# Patient Record
Sex: FEMALE | Race: WHITE | Employment: FULL TIME | URBAN - METROPOLITAN AREA
[De-identification: names, ages, dates, MRNs, and addresses within clinical notes are randomized per-mention and may not be internally consistent; named-entity substitution may affect disease eponyms.]

---

## 2018-09-04 ENCOUNTER — HOSPITAL ENCOUNTER (OUTPATIENT)
Age: 53
Discharge: HOME | End: 2018-09-06
Payer: COMMERCIAL

## 2018-09-04 ENCOUNTER — OFFICE VISIT (OUTPATIENT)
Dept: FAMILY MEDICINE CLINIC | Age: 53
End: 2018-09-04
Payer: COMMERCIAL

## 2018-09-04 ENCOUNTER — HOSPITAL ENCOUNTER (OUTPATIENT)
Age: 53
Discharge: HOME | End: 2018-09-04
Payer: COMMERCIAL

## 2018-09-04 ENCOUNTER — TELEPHONE (OUTPATIENT)
Dept: ADMINISTRATIVE | Age: 53
End: 2018-09-04

## 2018-09-04 VITALS
HEART RATE: 68 BPM | WEIGHT: 119 LBS | SYSTOLIC BLOOD PRESSURE: 120 MMHG | BODY MASS INDEX: 21.9 KG/M2 | HEIGHT: 62 IN | RESPIRATION RATE: 16 BRPM | DIASTOLIC BLOOD PRESSURE: 74 MMHG | TEMPERATURE: 98.8 F

## 2018-09-04 DIAGNOSIS — R10.2 PELVIC PAIN: ICD-10-CM

## 2018-09-04 DIAGNOSIS — R10.31 BILATERAL LOWER ABDOMINAL PAIN: Primary | ICD-10-CM

## 2018-09-04 DIAGNOSIS — R10.32 BILATERAL LOWER ABDOMINAL PAIN: Primary | ICD-10-CM

## 2018-09-04 LAB
APPEARANCE FLUID: NORMAL
BACTERIA: ABNORMAL /HPF
BASOPHILS ABSOLUTE: 0.03 E9/L (ref 0–0.2)
BASOPHILS RELATIVE PERCENT: 0.4 % (ref 0–2)
BILIRUBIN URINE: NEGATIVE
BILIRUBIN, POC: NORMAL
BLOOD URINE, POC: NORMAL
BLOOD, URINE: NEGATIVE
CLARITY, POC: NORMAL
CLARITY: CLEAR
COLOR, POC: CLEAR
COLOR: YELLOW
EOSINOPHILS ABSOLUTE: 0.04 E9/L (ref 0.05–0.5)
EOSINOPHILS RELATIVE PERCENT: 0.5 % (ref 0–6)
GLUCOSE URINE, POC: NORMAL
GLUCOSE URINE: NEGATIVE MG/DL
HCT VFR BLD CALC: 39.3 % (ref 34–48)
HEMOGLOBIN: 13 G/DL (ref 11.5–15.5)
IMMATURE GRANULOCYTES #: 0.01 E9/L
IMMATURE GRANULOCYTES %: 0.1 % (ref 0–5)
KETONES, POC: NORMAL
KETONES, URINE: NEGATIVE MG/DL
LEUKOCYTE EST, POC: NORMAL
LEUKOCYTE ESTERASE, URINE: ABNORMAL
LYMPHOCYTES ABSOLUTE: 1.51 E9/L (ref 1.5–4)
LYMPHOCYTES RELATIVE PERCENT: 19.5 % (ref 20–42)
MCH RBC QN AUTO: 31.1 PG (ref 26–35)
MCHC RBC AUTO-ENTMCNC: 33.1 % (ref 32–34.5)
MCV RBC AUTO: 94 FL (ref 80–99.9)
MONOCYTES ABSOLUTE: 0.5 E9/L (ref 0.1–0.95)
MONOCYTES RELATIVE PERCENT: 6.4 % (ref 2–12)
NEUTROPHILS ABSOLUTE: 5.67 E9/L (ref 1.8–7.3)
NEUTROPHILS RELATIVE PERCENT: 73.1 % (ref 43–80)
NITRITE, POC: NORMAL
NITRITE, URINE: NEGATIVE
PDW BLD-RTO: 13.2 FL (ref 11.5–15)
PH UA: 6 (ref 5–9)
PH, POC: 6
PLATELET # BLD: 276 E9/L (ref 130–450)
PMV BLD AUTO: 9.8 FL (ref 7–12)
PROTEIN UA: NEGATIVE MG/DL
PROTEIN, POC: NORMAL
RBC # BLD: 4.18 E12/L (ref 3.5–5.5)
RBC UA: ABNORMAL /HPF (ref 0–2)
RENAL EPITHELIAL, UA: ABNORMAL /HPF
SEDIMENTATION RATE, ERYTHROCYTE: 52 MM/HR (ref 0–20)
SPECIFIC GRAVITY UA: 1.01 (ref 1–1.03)
SPECIFIC GRAVITY, POC: 1
UROBILINOGEN, POC: 0.2
UROBILINOGEN, URINE: 0.2 E.U./DL
WBC # BLD: 7.8 E9/L (ref 4.5–11.5)
WBC UA: ABNORMAL /HPF (ref 0–5)

## 2018-09-04 PROCEDURE — 81001 URINALYSIS AUTO W/SCOPE: CPT

## 2018-09-04 PROCEDURE — 85025 COMPLETE CBC W/AUTO DIFF WBC: CPT

## 2018-09-04 PROCEDURE — 81002 URINALYSIS NONAUTO W/O SCOPE: CPT | Performed by: FAMILY MEDICINE

## 2018-09-04 PROCEDURE — 85651 RBC SED RATE NONAUTOMATED: CPT

## 2018-09-04 PROCEDURE — 87088 URINE BACTERIA CULTURE: CPT

## 2018-09-04 PROCEDURE — 99213 OFFICE O/P EST LOW 20 MIN: CPT | Performed by: FAMILY MEDICINE

## 2018-09-04 PROCEDURE — 36415 COLL VENOUS BLD VENIPUNCTURE: CPT

## 2018-09-04 ASSESSMENT — PATIENT HEALTH QUESTIONNAIRE - PHQ9
1. LITTLE INTEREST OR PLEASURE IN DOING THINGS: 0
2. FEELING DOWN, DEPRESSED OR HOPELESS: 0
SUM OF ALL RESPONSES TO PHQ9 QUESTIONS 1 & 2: 0
SUM OF ALL RESPONSES TO PHQ QUESTIONS 1-9: 0
SUM OF ALL RESPONSES TO PHQ QUESTIONS 1-9: 0

## 2018-09-05 DIAGNOSIS — K52.9 ACUTE COLITIS: Primary | ICD-10-CM

## 2018-09-05 LAB — URINE CULTURE, ROUTINE: NORMAL

## 2018-09-05 RX ORDER — AMOXICILLIN AND CLAVULANATE POTASSIUM 875; 125 MG/1; MG/1
1 TABLET, FILM COATED ORAL 2 TIMES DAILY
Qty: 14 TABLET | Refills: 0 | Status: SHIPPED | OUTPATIENT
Start: 2018-09-05 | End: 2018-09-15

## 2018-09-05 NOTE — PROGRESS NOTES
Call patient   The blood test shows a possible inflammation of the colon  I will send a script for an antibiotic  See me next week  Thanks.

## 2018-09-11 ENCOUNTER — OFFICE VISIT (OUTPATIENT)
Dept: FAMILY MEDICINE CLINIC | Age: 53
End: 2018-09-11
Payer: COMMERCIAL

## 2018-09-11 VITALS
WEIGHT: 119 LBS | SYSTOLIC BLOOD PRESSURE: 121 MMHG | RESPIRATION RATE: 16 BRPM | BODY MASS INDEX: 21.77 KG/M2 | DIASTOLIC BLOOD PRESSURE: 76 MMHG | HEART RATE: 63 BPM

## 2018-09-11 DIAGNOSIS — K57.92 ACUTE DIVERTICULITIS: Primary | ICD-10-CM

## 2018-09-11 PROCEDURE — 99213 OFFICE O/P EST LOW 20 MIN: CPT | Performed by: FAMILY MEDICINE

## 2018-09-11 NOTE — PROGRESS NOTES
Abdominal pain is much better  No fever or chills  Normal bowel movements  On augmentin bid  No fever or chills  Labs reviewed and normal CBC but elevated ESR    Physical examination :  /76   Pulse 63   Resp 16   Wt 119 lb (54 kg)   BMI 21.77 kg/m²     General appearance : healthy, no distress. Eyes : non-icteric sclerae. No goiter. Neck is supple, no masses. No carotid bruits  Lungs : clear bilaterally, no wheezing or crackles. Heart : regular, normal S1S2, no murmurs. Abdomen : soft, no masses. No hepatic or splenomegaly. Extremities : no edema. Peripheral pulses : normal.  Gait : normal.  Mood :euthymic. Affect : congruent. BMI was below limits today and the following plan was recommended: general diet. A/P  Likely acute diverticulitis resolving  Continue and finish augmentin  Precautions given.   Advised pap and mammogram

## 2019-04-29 ENCOUNTER — OFFICE VISIT (OUTPATIENT)
Dept: FAMILY MEDICINE CLINIC | Age: 54
End: 2019-04-29
Payer: COMMERCIAL

## 2019-04-29 ENCOUNTER — HOSPITAL ENCOUNTER (OUTPATIENT)
Age: 54
Discharge: HOME | End: 2019-05-01
Payer: COMMERCIAL

## 2019-04-29 ENCOUNTER — HOSPITAL ENCOUNTER (OUTPATIENT)
Dept: GENERAL RADIOLOGY | Age: 54
Discharge: HOME | End: 2019-05-01
Payer: COMMERCIAL

## 2019-04-29 VITALS
DIASTOLIC BLOOD PRESSURE: 85 MMHG | HEART RATE: 69 BPM | BODY MASS INDEX: 23.04 KG/M2 | RESPIRATION RATE: 18 BRPM | HEIGHT: 63 IN | WEIGHT: 130 LBS | SYSTOLIC BLOOD PRESSURE: 129 MMHG

## 2019-04-29 DIAGNOSIS — M79.672 LEFT FOOT PAIN: Primary | ICD-10-CM

## 2019-04-29 DIAGNOSIS — J30.1 SEASONAL ALLERGIC RHINITIS DUE TO POLLEN: ICD-10-CM

## 2019-04-29 DIAGNOSIS — M79.672 LEFT FOOT PAIN: ICD-10-CM

## 2019-04-29 PROCEDURE — 4004F PT TOBACCO SCREEN RCVD TLK: CPT | Performed by: FAMILY MEDICINE

## 2019-04-29 PROCEDURE — 99213 OFFICE O/P EST LOW 20 MIN: CPT | Performed by: FAMILY MEDICINE

## 2019-04-29 PROCEDURE — 3017F COLORECTAL CA SCREEN DOC REV: CPT | Performed by: FAMILY MEDICINE

## 2019-04-29 PROCEDURE — 73630 X-RAY EXAM OF FOOT: CPT

## 2019-04-29 PROCEDURE — G8427 DOCREV CUR MEDS BY ELIG CLIN: HCPCS | Performed by: FAMILY MEDICINE

## 2019-04-29 PROCEDURE — G8420 CALC BMI NORM PARAMETERS: HCPCS | Performed by: FAMILY MEDICINE

## 2019-04-29 RX ORDER — MOMETASONE FUROATE 50 UG/1
2 SPRAY, METERED NASAL DAILY
Qty: 1 INHALER | Refills: 5 | Status: SHIPPED
Start: 2019-04-29 | End: 2020-12-04 | Stop reason: SDUPTHER

## 2019-04-29 RX ORDER — GABAPENTIN 600 MG/1
TABLET ORAL
Refills: 2 | COMMUNITY
Start: 2019-04-22 | End: 2019-05-23

## 2019-04-29 ASSESSMENT — PATIENT HEALTH QUESTIONNAIRE - PHQ9
SUM OF ALL RESPONSES TO PHQ9 QUESTIONS 1 & 2: 0
SUM OF ALL RESPONSES TO PHQ QUESTIONS 1-9: 0
2. FEELING DOWN, DEPRESSED OR HOPELESS: 0
SUM OF ALL RESPONSES TO PHQ QUESTIONS 1-9: 0
1. LITTLE INTEREST OR PLEASURE IN DOING THINGS: 0

## 2019-04-29 NOTE — PROGRESS NOTES
One month ago tripped and fell  Continues to have pain in left foot superiorly. Pain that wraps around to the plantar aspect of the forefoot. Very little swelling  No bruising  Pain is about the same for the past month or so. He is able to walk although this tends to exacerbate the pain. Examination  Blood pressure 129/85, pulse 69, resp. rate 18, height 5' 2.5\" (1.588 m), weight 130 lb (59 kg), not currently breastfeeding. Normal gait  Ankle examination reveals no edema or deformities. Range of motion is normal  No edema or discoloration of the left foot. Minimal tenderness over lateral inframalleolar area  Normal ROM  Able to walk    A/P  Foot pain  Likely sprain  X rays  Rest  Use a walking shoe. Precautions given. If pain does not improve with rest for the next 2-3 weeks she will need to be referred to a podiatrist. Patient fully informed.

## 2019-10-29 ENCOUNTER — OFFICE VISIT (OUTPATIENT)
Dept: FAMILY MEDICINE CLINIC | Age: 54
End: 2019-10-29
Payer: COMMERCIAL

## 2019-10-29 ENCOUNTER — TELEPHONE (OUTPATIENT)
Dept: FAMILY MEDICINE CLINIC | Age: 54
End: 2019-10-29

## 2019-10-29 VITALS
RESPIRATION RATE: 16 BRPM | DIASTOLIC BLOOD PRESSURE: 80 MMHG | BODY MASS INDEX: 24.27 KG/M2 | HEART RATE: 58 BPM | HEIGHT: 63 IN | SYSTOLIC BLOOD PRESSURE: 124 MMHG | WEIGHT: 137 LBS

## 2019-10-29 DIAGNOSIS — M85.89 OSTEOPENIA OF MULTIPLE SITES: Chronic | ICD-10-CM

## 2019-10-29 DIAGNOSIS — M79.671 RIGHT FOOT PAIN: ICD-10-CM

## 2019-10-29 DIAGNOSIS — M79.7 FIBROMYALGIA: Primary | ICD-10-CM

## 2019-10-29 DIAGNOSIS — J30.1 SEASONAL ALLERGIC RHINITIS DUE TO POLLEN: ICD-10-CM

## 2019-10-29 PROCEDURE — G8427 DOCREV CUR MEDS BY ELIG CLIN: HCPCS | Performed by: FAMILY MEDICINE

## 2019-10-29 PROCEDURE — G8484 FLU IMMUNIZE NO ADMIN: HCPCS | Performed by: FAMILY MEDICINE

## 2019-10-29 PROCEDURE — 1036F TOBACCO NON-USER: CPT | Performed by: FAMILY MEDICINE

## 2019-10-29 PROCEDURE — 99213 OFFICE O/P EST LOW 20 MIN: CPT | Performed by: FAMILY MEDICINE

## 2019-10-29 PROCEDURE — G8420 CALC BMI NORM PARAMETERS: HCPCS | Performed by: FAMILY MEDICINE

## 2019-10-29 PROCEDURE — 3017F COLORECTAL CA SCREEN DOC REV: CPT | Performed by: FAMILY MEDICINE

## 2019-10-29 RX ORDER — GABAPENTIN 600 MG/1
TABLET ORAL
Qty: 270 TABLET | Refills: 0 | Status: SHIPPED | OUTPATIENT
Start: 2019-10-29 | End: 2019-11-29 | Stop reason: SDUPTHER

## 2019-11-29 DIAGNOSIS — M79.7 FIBROMYALGIA: ICD-10-CM

## 2019-11-30 RX ORDER — GABAPENTIN 600 MG/1
TABLET ORAL
Qty: 270 TABLET | Refills: 0 | Status: SHIPPED
Start: 2019-11-30 | End: 2020-04-10 | Stop reason: SDUPTHER

## 2020-04-10 ENCOUNTER — TELEPHONE (OUTPATIENT)
Dept: FAMILY MEDICINE CLINIC | Age: 55
End: 2020-04-10

## 2020-04-10 RX ORDER — GABAPENTIN 600 MG/1
TABLET ORAL
Qty: 270 TABLET | Refills: 0 | Status: SHIPPED
Start: 2020-04-10 | End: 2020-09-04

## 2020-04-10 NOTE — TELEPHONE ENCOUNTER
Call patient  Refill sent  She needs to make apt with Dr. Griffin Nguyen in the next 1-2 months to get established and for future refills  Thanks.

## 2020-09-04 RX ORDER — GABAPENTIN 600 MG/1
600 TABLET ORAL 3 TIMES DAILY
Qty: 90 TABLET | Refills: 0 | Status: SHIPPED
Start: 2020-09-04 | End: 2020-09-29

## 2020-09-29 RX ORDER — GABAPENTIN 600 MG/1
TABLET ORAL
Qty: 90 TABLET | Refills: 0 | Status: SHIPPED
Start: 2020-09-29 | End: 2020-11-06

## 2020-09-29 NOTE — TELEPHONE ENCOUNTER
[facility] shows refills as follows:  10/19/18 #90 for 30d  11/20/18 #90 for 30d  2/18/19 #90 for 30d  3/22/19 #90 for 30d  10/29/19 #90 for 10d    Our chart on the other hand is showing monthly refills for 600mg TID #90. Please clarify with the patient that she has been on this daily at the 600mg TID dosing. I prefer not to make dose changes to this at this time without seeing her to discuss. One possible reason is filling out of state (saw the 10/1/20 appt moved due to \"being out of state\") or that the pharmacy didn't report it to the [facility] system.     Thanks

## 2020-09-29 NOTE — TELEPHONE ENCOUNTER
Last Appointment   10/29/2019  Next Appointment  12/3/2020 to establish with [place]. Patient called, states that she is due for her refill on gabapentin.   Med pending drs approval.

## 2020-11-06 RX ORDER — GABAPENTIN 600 MG/1
TABLET ORAL
Qty: 90 TABLET | Refills: 0 | Status: SHIPPED
Start: 2020-11-06 | End: 2020-12-03 | Stop reason: SDUPTHER

## 2020-12-03 ENCOUNTER — VIRTUAL VISIT (OUTPATIENT)
Dept: FAMILY MEDICINE CLINIC | Age: 55
End: 2020-12-03
Payer: COMMERCIAL

## 2020-12-03 PROCEDURE — 1036F TOBACCO NON-USER: CPT | Performed by: FAMILY MEDICINE

## 2020-12-03 PROCEDURE — 3017F COLORECTAL CA SCREEN DOC REV: CPT | Performed by: FAMILY MEDICINE

## 2020-12-03 PROCEDURE — G8421 BMI NOT CALCULATED: HCPCS | Performed by: FAMILY MEDICINE

## 2020-12-03 PROCEDURE — G8427 DOCREV CUR MEDS BY ELIG CLIN: HCPCS | Performed by: FAMILY MEDICINE

## 2020-12-03 PROCEDURE — G8484 FLU IMMUNIZE NO ADMIN: HCPCS | Performed by: FAMILY MEDICINE

## 2020-12-03 PROCEDURE — 99213 OFFICE O/P EST LOW 20 MIN: CPT | Performed by: FAMILY MEDICINE

## 2020-12-03 ASSESSMENT — PATIENT HEALTH QUESTIONNAIRE - PHQ9
SUM OF ALL RESPONSES TO PHQ QUESTIONS 1-9: 0
1. LITTLE INTEREST OR PLEASURE IN DOING THINGS: 0
SUM OF ALL RESPONSES TO PHQ QUESTIONS 1-9: 0
SUM OF ALL RESPONSES TO PHQ9 QUESTIONS 1 & 2: 0
SUM OF ALL RESPONSES TO PHQ QUESTIONS 1-9: 0
2. FEELING DOWN, DEPRESSED OR HOPELESS: 0

## 2020-12-03 NOTE — PROGRESS NOTES
12/3/2020    TELEHEALTH EVALUATION -- Audio (During KIDOJ-[id number] public health emergency)    HPI:    Pardeep Deleon (:  1965) has requested an audio/video evaluation for the following concern(s):    Low Bone Density and early menopause - was told she has low bone density in  - no DEXA available today. Did have fx ankle (5th metatarsal) and probable fx wrist after a fall this year though she states no XR was done. Neuropathy - uses gabapentin for fibromyalgia and vulvodynia pains. NO SE to meds. Tolerating well and the meds seem to help her. Wishes to continue. Will need renal function check. Does feel worse if she is late for a dose. Allergic rhinitis - some dust with albert decorations but overall sx are controlled. Review of Systems   Respiratory: Negative for chest tightness and shortness of breath. Cardiovascular: Negative for chest pain and palpitations. Gastrointestinal: Negative for abdominal pain. Prior to Visit Medications    Medication Sig Taking? Authorizing Provider   mometasone (NASONEX) 50 MCG/ACT nasal spray 2 sprays by Nasal route daily Yes Maribeth Herrera MD   gabapentin (NEURONTIN) 600 MG tablet Take 1 tablet by mouth 3 times daily for 30 days. Yes Maribeth Herrera MD   Omega-3 Fatty Acids (FISH OIL) 1000 MG CAPS OTC Yes Maribeth Herrera MD   Turmeric (QC TUMERIC COMPLEX) 500 MG CAPS OTC Yes Maribeth Herrera MD   Multiple Vitamins-Minerals (MULTIVITAMIN WITH MINERALS) tablet otc Yes Maribeth Herrera MD   vitamin D 1000 UNITS CAPS Take 1 capsule by mouth daily Yes Historical Provider, MD       Social History     Tobacco Use    Smoking status: Former Smoker     Packs/day: 0.50     Years: 0.50     Pack years: 0.25     Types: E-Cigarettes     Last attempt to quit: 2018     Years since quittin.6    Smokeless tobacco: Former User   Substance Use Topics    Alcohol use:  Yes    Drug use: No          PHYSICAL EXAMINATION: Psychiatric:       [x] Normal Affect [] No Hallucinations        [] Abnormal-     Other pertinent observable physical exam findings-     ASSESSMENT/PLAN:  1. Fibromyalgia  Stable sx - cont meds  - mometasone (NASONEX) 50 MCG/ACT nasal spray; 2 sprays by Nasal route daily  Dispense: 1 Inhaler; Refill: 5  - gabapentin (NEURONTIN) 600 MG tablet; Take 1 tablet by mouth 3 times daily for 30 days. Dispense: 270 tablet; Refill: 1  - Omega-3 Fatty Acids (FISH OIL) 1000 MG CAPS; OTC  Dispense: 1 capsule; Refill: 0  - Turmeric (QC TUMERIC COMPLEX) 500 MG CAPS; OTC  Dispense: 1 capsule; Refill: 0  - Multiple Vitamins-Minerals (MULTIVITAMIN WITH MINERALS) tablet; otc  Dispense: 30 tablet; Refill: 3  - COMPREHENSIVE METABOLIC PANEL; Future  - CBC; Future  - TSH; Future  - Lipid, Fasting; Future  - Vitamin D 25 Hydroxy; Future  - JOCELIN DIGITAL SCREEN BILATERAL PER PROTOCOL; Future    2. Encounter for screening mammogram for breast cancer  Screen recommended  - JOCELIN DIGITAL SCREEN BILATERAL PER PROTOCOL; Future    3. Osteopenia, unspecified location  Will need to consider repeat DEXA - discussed maintaining dietary Ca ~1000-1200mg daily. Discussed risks/benefits of Ca supplementation.    - mometasone (NASONEX) 50 MCG/ACT nasal spray; 2 sprays by Nasal route daily  Dispense: 1 Inhaler; Refill: 5  - gabapentin (NEURONTIN) 600 MG tablet; Take 1 tablet by mouth 3 times daily for 30 days. Dispense: 270 tablet; Refill: 1  - Omega-3 Fatty Acids (FISH OIL) 1000 MG CAPS; OTC  Dispense: 1 capsule; Refill: 0  - Turmeric (QC TUMERIC COMPLEX) 500 MG CAPS; OTC  Dispense: 1 capsule; Refill: 0  - Multiple Vitamins-Minerals (MULTIVITAMIN WITH MINERALS) tablet; otc  Dispense: 30 tablet; Refill: 3  - COMPREHENSIVE METABOLIC PANEL; Future  - CBC; Future  - TSH; Future  - Lipid, Fasting; Future  - Vitamin D 25 Hydroxy; Future  - JOCELIN DIGITAL SCREEN BILATERAL PER PROTOCOL;  Future      Return in about 1 year (around 12/3/2021) for

## 2020-12-04 RX ORDER — GABAPENTIN 600 MG/1
600 TABLET ORAL 3 TIMES DAILY
Qty: 270 TABLET | Refills: 1 | Status: SHIPPED
Start: 2020-12-04 | End: 2021-05-10

## 2020-12-04 RX ORDER — MOMETASONE FUROATE 50 UG/1
2 SPRAY, METERED NASAL DAILY
Qty: 1 INHALER | Refills: 5 | Status: SHIPPED
Start: 2020-12-04 | End: 2022-02-21 | Stop reason: SDUPTHER

## 2020-12-04 RX ORDER — GABAPENTIN 600 MG/1
TABLET ORAL
Qty: 270 TABLET | Refills: 0 | OUTPATIENT
Start: 2020-12-04 | End: 2021-01-03

## 2020-12-04 RX ORDER — CHLORAL HYDRATE 500 MG
CAPSULE ORAL
Qty: 1 CAPSULE | Refills: 0
Start: 2020-12-04

## 2020-12-04 RX ORDER — VIT C/B6/B5/MAGNESIUM/HERB 173 50-5-6-5MG
CAPSULE ORAL
Qty: 1 CAPSULE | Refills: 0
Start: 2020-12-04

## 2020-12-04 RX ORDER — MULTIVIT-MIN/IRON FUM/FOLIC AC 7.5 MG-4
TABLET ORAL
Qty: 30 TABLET | Refills: 3
Start: 2020-12-04

## 2020-12-08 ASSESSMENT — ENCOUNTER SYMPTOMS
SHORTNESS OF BREATH: 0
ABDOMINAL PAIN: 0
CHEST TIGHTNESS: 0

## 2021-05-08 DIAGNOSIS — M79.7 FIBROMYALGIA: ICD-10-CM

## 2021-05-08 DIAGNOSIS — M85.80 OSTEOPENIA, UNSPECIFIED LOCATION: Chronic | ICD-10-CM

## 2021-05-10 RX ORDER — GABAPENTIN 600 MG/1
600 TABLET ORAL 3 TIMES DAILY
Qty: 270 TABLET | Refills: 0 | Status: SHIPPED
Start: 2021-05-10 | End: 2021-09-09

## 2021-09-09 DIAGNOSIS — M79.7 FIBROMYALGIA: ICD-10-CM

## 2021-09-09 DIAGNOSIS — M85.80 OSTEOPENIA, UNSPECIFIED LOCATION: Chronic | ICD-10-CM

## 2021-09-09 RX ORDER — GABAPENTIN 600 MG/1
600 TABLET ORAL 3 TIMES DAILY
Qty: 270 TABLET | Refills: 0 | Status: SHIPPED
Start: 2021-09-09 | End: 2021-12-13

## 2021-09-09 NOTE — TELEPHONE ENCOUNTER
Called and spoke to the pharmacist. She tried to run it as well and confirmed all spelling /   and they were not able to run report either. She stated that the only thing she can think of is that they have not made Gabapentin controlled yet and it may not be getting reported in the state of [place].

## 2021-09-10 ENCOUNTER — TELEPHONE (OUTPATIENT)
Dept: FAMILY MEDICINE CLINIC | Age: 56
End: 2021-09-10

## 2021-09-10 NOTE — TELEPHONE ENCOUNTER
----- Message from Stephanie Plants sent at 9/9/2021  4:58 PM EDT -----  Subject: Message to Provider    QUESTIONS  Information for Provider? Deidre at [address] [address] just spoke   with Dr. Shayy Gutierrez and she misspoke about [facility]. System does connect with   O[facility], gabapentin is not showing up, but other controls do. Just an FYI  ---------------------------------------------------------------------------  --------------  CALL BACK INFO  What is the best way for the office to contact you? OK to leave message on   voicemail  Preferred Call Back Phone Number? [phone]  ---------------------------------------------------------------------------  --------------  SCRIPT ANSWERS  Relationship to Patient? Third Party  Representative Name?  Deidre at [address] [address]

## 2021-12-13 DIAGNOSIS — M79.7 FIBROMYALGIA: ICD-10-CM

## 2021-12-13 DIAGNOSIS — M85.80 OSTEOPENIA, UNSPECIFIED LOCATION: Chronic | ICD-10-CM

## 2021-12-13 RX ORDER — GABAPENTIN 600 MG/1
600 TABLET ORAL 3 TIMES DAILY
Qty: 270 TABLET | Refills: 0 | Status: SHIPPED
Start: 2021-12-13 | End: 2022-02-21 | Stop reason: SDUPTHER

## 2021-12-13 NOTE — TELEPHONE ENCOUNTER
Last Appointment   12/3/2020  Next Appointment      Pt is complete out of her medication, She made an appointment to come in next month.   She is very busy with going to [facility] with her 's brain cancer so she made the appointment with the intension of coming in

## 2022-02-05 ENCOUNTER — APPOINTMENT (OUTPATIENT)
Dept: GENERAL RADIOLOGY | Age: 57
End: 2022-02-05
Payer: COMMERCIAL

## 2022-02-05 ENCOUNTER — HOSPITAL ENCOUNTER (EMERGENCY)
Age: 57
Discharge: HOME | End: 2022-02-05
Payer: COMMERCIAL

## 2022-02-05 VITALS
HEART RATE: 55 BPM | HEIGHT: 62 IN | SYSTOLIC BLOOD PRESSURE: 132 MMHG | TEMPERATURE: 95.5 F | WEIGHT: 135 LBS | BODY MASS INDEX: 24.84 KG/M2 | RESPIRATION RATE: 16 BRPM | DIASTOLIC BLOOD PRESSURE: 62 MMHG | OXYGEN SATURATION: 98 %

## 2022-02-05 DIAGNOSIS — S82.64XA CLOSED NONDISPLACED FRACTURE OF LATERAL MALLEOLUS OF RIGHT FIBULA, INITIAL ENCOUNTER: Primary | ICD-10-CM
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
## 2022-02-05 PROCEDURE — 99283 EMERGENCY DEPT VISIT LOW MDM: CPT

## 2022-02-05 PROCEDURE — 73630 X-RAY EXAM OF FOOT: CPT

## 2022-02-05 PROCEDURE — 29515 APPLICATION SHORT LEG SPLINT: CPT

## 2022-02-05 PROCEDURE — 73610 X-RAY EXAM OF ANKLE: CPT

## 2022-02-05 ASSESSMENT — PAIN DESCRIPTION - PROGRESSION: CLINICAL_PROGRESSION: NOT CHANGED

## 2022-02-05 ASSESSMENT — PAIN SCALES - GENERAL: PAINLEVEL_OUTOF10: 5

## 2022-02-05 ASSESSMENT — PAIN DESCRIPTION - FREQUENCY: FREQUENCY: CONTINUOUS

## 2022-02-05 ASSESSMENT — PAIN DESCRIPTION - ORIENTATION: ORIENTATION: RIGHT

## 2022-02-05 ASSESSMENT — PAIN DESCRIPTION - PAIN TYPE: TYPE: ACUTE PAIN

## 2022-02-05 ASSESSMENT — PAIN DESCRIPTION - LOCATION: LOCATION: ANKLE

## 2022-02-05 NOTE — ED PROVIDER NOTES
[facility]  Department of Emergency Medicine   ED  Encounter Note  Admit Date/RoomTime: 2022 12:33 PM  ED Room: 10/10    NAME: Gee Chapman  : 1965  MRN: [id number]     Chief Complaint:  Ankle Pain (fell down steps and injured right ankle, also co buttocks pain and lower back pain which is chronic)    History of Present Illness         Gee Chapman is a 61 y.o. old female presenting to the emergency department by private vehicle, for traumatic Right foot and ankle pain which occured 1 day(s) prior to arrival.  The complaint is due to a fall from tripping and down several steps while at home. Since onset the symptoms have been waxing and waning with inability to bear weight,swelling,bruising. Patient has no prior history of pain/injury with regards to today's visit. Her pain is aggraveated by standing or walking and relieved by nothing, as no treatment has been provided prior to this visit. She denies any head injury, headache, loss of consciousness, confusion, dizziness, neck pain, chest pain or abdominal pain. Tetanus Status: up to date. ROS   Pertinent positives and negatives are stated within HPI, all other systems reviewed and are negative. Past Medical History:  has a past medical history of Allergic rhinitis, Early menopause, Fibromyalgia, H/O mammogram, Osteopenia, Pap test, as part of routine gynecological examination, Tobacco abuse, and Vulvodynia. Surgical History:  has a past surgical history that includes Colonoscopy (2013 Bernice Ovalles) and laparoscopy (1996). Social History:  reports that she quit smoking about 3 years ago. Her smoking use included e-cigarettes. She has a 0.25 pack-year smoking history. She has quit using smokeless tobacco. She reports current alcohol use. She reports that she does not use drugs. Family History: family history includes Other in her father and mother. Allergies:  Other    Physical Exam   Oxygen Saturation Interpretation: Normal.        ED Triage Vitals   BP Temp Temp src Pulse Resp SpO2 Height Weight   02/05/22 1219 02/05/22 1219 -- 02/05/22 1219 02/05/22 1219 02/05/22 1219 02/05/22 1230 02/05/22 1230   132/62 95.5 °F (35.3 °C)  55 16 98 % 5' 2\" (1.575 m) 135 lb (61.2 kg)         Constitutional:  Alert, development consistent with age. Neck:  Normal ROM. Supple. Right Ankle: Lateral malleolus              Tenderness:  severe. Swelling: Moderate. Deformity: no deformity observed/palpated. ROM: diminished range with pain. Skin:  tenderness, swelling and ecchymosis. Neurovascular: Motor deficit: none. Sensory deficit:   none. Pulse deficit: none. Capillary refill: normal.  Right Knee:              Tenderness:  none. Swelling: None. Deformity: no deformity observed/palpated. ROM: full range of motion. Skin:  no wounds, erythema, or swelling. Right Foot: diffusely across entire foot              Tenderness:  none. Swelling: None. Deformity: no deformity observed/palpated. ROM: full range of motion. Skin:  no wounds, erythema, or swelling  Gait:  limp due to affected limb. Lymphatics: No lymphangitis or adenopathy noted. Neurological:  Oriented. Motor functions intact. Lab / Imaging Results   (All laboratory and radiology results have been personally reviewed by myself)  Labs:  No results found for this visit on 02/05/22. Imaging: All Radiology results interpreted by Radiologist unless otherwise noted. XR FOOT RIGHT (MIN 3 VIEWS)   Final Result   Acute fracture of the lateral malleolus. No acute fractures or dislocation in the right foot.   The         XR ANKLE RIGHT (MIN 3 VIEWS)   Final Result   Presence of acute displaced fracture of the distal right fibula through the   level of the home.  Patient condition is good    New Medications     Discharge Medication List as of 2/5/2022  2:17 PM        Electronically signed by MARNI Ferraro CNP   DD: 2/5/22  **This report was transcribed using voice recognition software. Every effort was made to ensure accuracy; however, inadvertent computerized transcription errors may be present.   END OF ED PROVIDER NOTE       MARNI Srinivasan CNP  02/05/22 [id number]

## 2022-02-05 NOTE — Clinical Note
Sola Cruz was seen and treated in our emergency department on 2/5/2022. She may return to work on 02/09/2022. If you have any questions or concerns, please don't hesitate to call.       Aleksey Chadwick, MARNI - CNP

## 2022-02-21 ENCOUNTER — OFFICE VISIT (OUTPATIENT)
Dept: FAMILY MEDICINE CLINIC | Age: 57
End: 2022-02-21
Payer: COMMERCIAL

## 2022-02-21 VITALS
TEMPERATURE: 98.2 F | HEART RATE: 55 BPM | OXYGEN SATURATION: 99 % | DIASTOLIC BLOOD PRESSURE: 77 MMHG | SYSTOLIC BLOOD PRESSURE: 124 MMHG

## 2022-02-21 DIAGNOSIS — M79.7 FIBROMYALGIA: ICD-10-CM

## 2022-02-21 DIAGNOSIS — Z12.31 ENCOUNTER FOR SCREENING MAMMOGRAM FOR MALIGNANT NEOPLASM OF BREAST: ICD-10-CM

## 2022-02-21 DIAGNOSIS — J30.2 SEASONAL ALLERGIC RHINITIS, UNSPECIFIED TRIGGER: Primary | ICD-10-CM

## 2022-02-21 DIAGNOSIS — M85.80 OSTEOPENIA, UNSPECIFIED LOCATION: Chronic | ICD-10-CM

## 2022-02-21 PROCEDURE — G8420 CALC BMI NORM PARAMETERS: HCPCS

## 2022-02-21 PROCEDURE — G8427 DOCREV CUR MEDS BY ELIG CLIN: HCPCS

## 2022-02-21 PROCEDURE — 1036F TOBACCO NON-USER: CPT

## 2022-02-21 PROCEDURE — 3017F COLORECTAL CA SCREEN DOC REV: CPT

## 2022-02-21 PROCEDURE — 99214 OFFICE O/P EST MOD 30 MIN: CPT

## 2022-02-21 PROCEDURE — G8484 FLU IMMUNIZE NO ADMIN: HCPCS

## 2022-02-21 RX ORDER — GABAPENTIN 600 MG/1
600 TABLET ORAL 3 TIMES DAILY
Qty: 270 TABLET | Refills: 0 | Status: SHIPPED | OUTPATIENT
Start: 2022-02-21 | End: 2022-06-16

## 2022-02-21 RX ORDER — MOMETASONE FUROATE 50 UG/1
2 SPRAY, METERED NASAL DAILY
Qty: 1 EACH | Refills: 0 | Status: SHIPPED | OUTPATIENT
Start: 2022-02-21

## 2022-02-21 SDOH — ECONOMIC STABILITY: FOOD INSECURITY: WITHIN THE PAST 12 MONTHS, YOU WORRIED THAT YOUR FOOD WOULD RUN OUT BEFORE YOU GOT MONEY TO BUY MORE.: NEVER TRUE

## 2022-02-21 SDOH — ECONOMIC STABILITY: FOOD INSECURITY: WITHIN THE PAST 12 MONTHS, THE FOOD YOU BOUGHT JUST DIDN'T LAST AND YOU DIDN'T HAVE MONEY TO GET MORE.: NEVER TRUE

## 2022-02-21 ASSESSMENT — SOCIAL DETERMINANTS OF HEALTH (SDOH): HOW HARD IS IT FOR YOU TO PAY FOR THE VERY BASICS LIKE FOOD, HOUSING, MEDICAL CARE, AND HEATING?: NOT HARD AT ALL

## 2022-02-21 ASSESSMENT — ENCOUNTER SYMPTOMS
SHORTNESS OF BREATH: 0
RHINORRHEA: 0
EYE PAIN: 0
WHEEZING: 0
CONSTIPATION: 0
VOMITING: 0
ABDOMINAL PAIN: 0
COUGH: 0
DIARRHEA: 0
NAUSEA: 0
CHEST TIGHTNESS: 0
SORE THROAT: 0

## 2022-02-21 ASSESSMENT — PATIENT HEALTH QUESTIONNAIRE - PHQ9
2. FEELING DOWN, DEPRESSED OR HOPELESS: 0
SUM OF ALL RESPONSES TO PHQ QUESTIONS 1-9: 0
SUM OF ALL RESPONSES TO PHQ9 QUESTIONS 1 & 2: 0
SUM OF ALL RESPONSES TO PHQ QUESTIONS 1-9: 0
1. LITTLE INTEREST OR PLEASURE IN DOING THINGS: 0

## 2022-02-21 NOTE — PROGRESS NOTES
S: 61 y.o. female with   Chief Complaint   Patient presents with    Medication Refill       Rhinitis  -f/u  -tolerating medication well    Fibromyalgia  -f/u  -tolerating medication well    Mood  - passed away, is coping well, follows with counselor    Right foot fx  -following with specialist for this    O: VS:  temperature is 98.2 °F (36.8 °C). Her blood pressure is 124/77 and her pulse is 55. Her oxygen saturation is 99%. BP Readings from Last 3 Encounters:   02/21/22 124/77   02/05/22 132/62   10/29/19 124/80     See resident note    Impression/Plan:   1) Rhinitis - refill nasonex  2) Fibro - refill neurontin  3) Mood - doing ok, coping normally   4) Right ankle fx - follow with specialist   5) Annual physical - labs   6) Cancer screening - mammo and pap       Health Maintenance Due   Topic Date Due    Hepatitis C screen  Never done    COVID-19 Vaccine (1) Never done    Depression Screen  Never done    HIV screen  Never done    DTaP/Tdap/Td vaccine (1 - Tdap) Never done    Shingles Vaccine (1 of 2) Never done    Lipid screen  07/31/2017    Breast cancer screen  08/04/2017    Cervical cancer screen  08/04/2018    Flu vaccine (1) Never done         Attending Physician Statement  I have discussed the case, including pertinent history and exam findings with the resident. I agree with the documented assessment and plan.       Nita Simmonds,

## 2022-02-21 NOTE — PROGRESS NOTES
[facility]  Department of Family Medicine  Family Medicine Residency Program      Patient: Andrea Gusman 61 y.o. female     Date of Service: 2/21/22      Chief complaint:   Chief Complaint   Patient presents with    Medication Refill       HISTORY OF PRESENTING ILLNESS     61 y.o. female with a PMH of fibromyalgia and osteopenia presented to the clinic for a medication refill-gabapentin and Nasonex    Fibromyalgia  -Patient has a history of fibromyalgia and has been on gabapentin since 1994  -Patient tolerating medication well, denies side effects  -Currently denies myalgia, arthralgia, fatigue     Allergic rhinitis  -Patient would like refill of Nasonex  -States she has symptoms of rhinorrhea, sneezing during spring and fall  -States she tolerates medication well and uses as prescribed  -Patient has not not had to use medication since last fall but would like to have prescription as weather is changing    Fibular fracture (02/05/22)  -Closed nondisplaced fracture of lateral malleolus of right fibula  -pt sees Dr. Paullette Osgood  -Patient has been using crutches to ambulate     passed away 1 month ago   -good support at home  -Follows with counselor  -Patient reports waves of depressed mood.   Denies suicidal ideation, suicidal ideation, self injury, sleep disturbances, difficulty concentrating, anhedonia   -States \" I am  did not want to be mourned but celebrated\"    Care gaps  Last Pap 5 years ago  Patient refuses vaccination at this time    Health Maintenance:  Health Maintenance Due   Topic Date Due    Hepatitis C screen  Never done    COVID-19 Vaccine (1) Never done    Depression Screen  Never done    HIV screen  Never done    DTaP/Tdap/Td vaccine (1 - Tdap) Never done    Shingles Vaccine (1 of 2) Never done    Lipid screen  07/31/2017    Breast cancer screen  08/04/2017    Cervical cancer screen  08/04/2018    Flu vaccine (1) Never done     Past Medical History: Diagnosis Date    Allergic rhinitis     Early menopause     Fibromyalgia     H/O mammogram 2012    Osteopenia 8/8/2013    Pap test, as part of routine gynecological examination 2012    Philippe Michelle Tobacco abuse     age 39 to present    Vulvodynia      Past Surgical History:        Procedure Laterality Date    COLONOSCOPY  5/2013 Chadd Salazar    normal    LAPAROSCOPY  07/1996    for pelvic pain     Allergies: Other  Social History:   Social History     Socioeconomic History    Marital status:      Spouse name: Not on file    Number of children: Not on file    Years of education: Not on file    Highest education level: Not on file   Occupational History    Not on file   Tobacco Use    Smoking status: Former Smoker     Packs/day: 0.50     Years: 0.50     Pack years: 0.25     Types: E-Cigarettes     Quit date: 4/29/2018     Years since quitting: 3.8    Smokeless tobacco: Former User   Substance and Sexual Activity    Alcohol use: Yes    Drug use: No    Sexual activity: Never   Other Topics Concern    Not on file   Social History Narrative    Not on file     Social Determinants of Health     Financial Resource Strain: Low Risk     Difficulty of Paying Living Expenses: Not hard at all   Food Insecurity: No Food Insecurity    Worried About Running Out of Food in the Last Year: Never true    [address] in the Last Year: Never true   Transportation Needs:     Lack of Transportation (Medical): Not on file    Lack of Transportation (Non-Medical):  Not on file   Physical Activity:     Days of Exercise per Week: Not on file    Minutes of Exercise per Session: Not on file   Stress:     Feeling of Stress : Not on file   Social Connections:     Frequency of Communication with Friends and Family: Not on file    Frequency of Social Gatherings with Friends and Family: Not on file    Attends [employer] Services: Not on file    Active Member of Clubs or Organizations: Not on file    Attends [employer] or Organization Meetings: Not on file    Marital Status: Not on file   Intimate Partner Violence:     Fear of Current or Ex-Partner: Not on file    Emotionally Abused: Not on file    Physically Abused: Not on file    Sexually Abused: Not on file   Housing Stability:     Unable to Pay for Housing in the Last Year: Not on file    Number of [place] in the Last Year: Not on file    Unstable Housing in the Last Year: Not on file      Family History:       Problem Relation Age of Onset    Other Mother         chronic pain    Other Father         PE     Review of Systems:   Review of Systems   Constitutional: Negative for chills, fatigue and fever. HENT: Negative for congestion, hearing loss, rhinorrhea and sore throat. Eyes: Negative for pain and visual disturbance. Respiratory: Negative for cough, chest tightness, shortness of breath and wheezing. Cardiovascular: Negative for chest pain, palpitations and leg swelling. Gastrointestinal: Negative for abdominal pain, constipation, diarrhea, nausea and vomiting. Genitourinary: Negative for difficulty urinating, dysuria and hematuria. Musculoskeletal: Negative for arthralgias and myalgias. Skin: Negative for rash and wound. Neurological: Negative for dizziness, weakness, light-headedness, numbness and headaches. Psychiatric/Behavioral: Negative for dysphoric mood, self-injury, sleep disturbance and suicidal ideas. The patient is not nervous/anxious. PHYSICAL EXAM     Vitals: /77   Pulse 55   Temp 98.2 °F (36.8 °C)   SpO2 99%   Physical Exam  Vitals and nursing note reviewed. Constitutional:       General: She is not in acute distress. Appearance: Normal appearance. She is not ill-appearing. HENT:      Head: Normocephalic and atraumatic. Mouth/Throat:      Mouth: Mucous membranes are moist.      Pharynx: No oropharyngeal exudate. Eyes:      General:         Right eye: No discharge.          Left eye: No discharge. Extraocular Movements: Extraocular movements intact. Pupils: Pupils are equal, round, and reactive to light. Cardiovascular:      Rate and Rhythm: Normal rate and regular rhythm. Pulses: Normal pulses. Heart sounds: Normal heart sounds. No murmur heard. Pulmonary:      Effort: Pulmonary effort is normal. No respiratory distress. Breath sounds: Normal breath sounds. No stridor. No wheezing, rhonchi or rales. Abdominal:      General: Abdomen is flat. There is no distension. Palpations: Abdomen is soft. Tenderness: There is no abdominal tenderness. Musculoskeletal:         General: Normal range of motion. Cervical back: Normal range of motion and neck supple. Right lower leg: No edema. Left lower leg: No edema. Skin:     General: Skin is warm and dry. Capillary Refill: Capillary refill takes less than 2 seconds. Neurological:      General: No focal deficit present. Mental Status: She is alert and oriented to person, place, and time. Psychiatric:         Mood and Affect: Mood normal.         Behavior: Behavior normal.         Thought Content: Thought content normal.         Judgment: Judgment normal.           ASSESSMENT AND PLAN     1. Fibromyalgia  - gabapentin (NEURONTIN) 600 MG tablet; Take 1 tablet by mouth 3 times daily for 90 days. Dispense: 270 tablet; Refill: 0  - Comprehensive Metabolic Panel; Future  - CBC with Auto Differential; Future  - TSH; Future  - LIPID PANEL; Future    2. Osteopenia, unspecified location  - Vitamin D 25 Hydroxy; Future    3. Seasonal allergic rhinitis, unspecified trigger  Currently asymptomatic  - mometasone (NASONEX) 50 MCG/ACT nasal spray; 2 sprays by Nasal route daily  Dispense: 1 each; Refill: 0    4. Encounter for screening mammogram for malignant neoplasm of breast  - JOCELIN DIGITAL SCREEN BILATERAL PER PROTOCOL;  Future       Encouraged Substitute healthy fats, such as olive oil, canola oil, grapeseed oil for saturated fats like butter, margarine, and shortening, Minimize processed foods and Increase fiber intake    Counseled regarding above diagnosis, including possible risks and complications, especially if left uncontrolled. Counseled regarding the possible side effects, risks, benefits and alternatives to treatment; patient and/or guardian verbalizes understanding, agrees, feels comfortable with and wishes to proceed with above treatment plan. Call or go to ED immediately if symptoms worsen or persist. Advised patient to call with any new medication issues, and, as applicable, read all Rx info from pharmacy to assure aware of all possible risks and side effects of medication before taking. Patient and/or guardian given opportunity to ask questions/raise concerns. The patient verbalized comfort and understanding of instructions. I encourage further reading and education about your health conditions. Information on many health conditions is provided by the American Academy of Family Physicians: https://familydoctor. org/  Please bring any questions to me at your next visit. Medication List:    Current Outpatient Medications   Medication Sig Dispense Refill    gabapentin (NEURONTIN) 600 MG tablet Take 1 tablet by mouth 3 times daily for 90 days. 270 tablet 0    mometasone (NASONEX) 50 MCG/ACT nasal spray 2 sprays by Nasal route daily 1 each 0    Omega-3 Fatty Acids (FISH OIL) 1000 MG CAPS OTC 1 capsule 0    Turmeric (QC TUMERIC COMPLEX) 500 MG CAPS OTC 1 capsule 0    Multiple Vitamins-Minerals (MULTIVITAMIN WITH MINERALS) tablet otc 30 tablet 3    vitamin D 1000 UNITS CAPS Take 1 capsule by mouth daily       No current facility-administered medications for this visit. Return to Office: Return for PAP with Dr. David Monique. This document may have been prepared at least partially through the use of voice recognition software.  Although effort is taken to assure the accuracy of this document, it is possible that grammatical, syntax,  or spelling errors may occur.     Emily Charles MD PGY-1

## 2022-02-22 LAB
ALBUMIN SERPL-MCNC: NORMAL G/DL
ALP BLD-CCNC: NORMAL U/L
ALT SERPL-CCNC: NORMAL U/L
ANION GAP SERPL CALCULATED.3IONS-SCNC: NORMAL MMOL/L
AST SERPL-CCNC: NORMAL U/L
BASOPHILS ABSOLUTE: NORMAL
BASOPHILS RELATIVE PERCENT: NORMAL
BILIRUB SERPL-MCNC: NORMAL MG/DL
BUN BLDV-MCNC: NORMAL MG/DL
CALCIUM SERPL-MCNC: NORMAL MG/DL
CHLORIDE BLD-SCNC: NORMAL MMOL/L
CHOLESTEROL, TOTAL: 175 MG/DL
CHOLESTEROL/HDL RATIO: 1.8
CO2: NORMAL
CREAT SERPL-MCNC: NORMAL MG/DL
EOSINOPHILS ABSOLUTE: NORMAL
EOSINOPHILS RELATIVE PERCENT: NORMAL
GFR CALCULATED: NORMAL
GLUCOSE BLD-MCNC: NORMAL MG/DL
HCT VFR BLD CALC: NORMAL %
HDLC SERPL-MCNC: 95 MG/DL (ref 35–70)
HEMOGLOBIN: NORMAL
LDL CHOLESTEROL CALCULATED: 69 MG/DL (ref 0–160)
LYMPHOCYTES ABSOLUTE: NORMAL
LYMPHOCYTES RELATIVE PERCENT: NORMAL
MCH RBC QN AUTO: NORMAL PG
MCHC RBC AUTO-ENTMCNC: NORMAL G/DL
MCV RBC AUTO: NORMAL FL
MONOCYTES ABSOLUTE: NORMAL
MONOCYTES RELATIVE PERCENT: NORMAL
NEUTROPHILS ABSOLUTE: NORMAL
NEUTROPHILS RELATIVE PERCENT: NORMAL
NONHDLC SERPL-MCNC: 80 MG/DL
PDW BLD-RTO: NORMAL %
PLATELET # BLD: NORMAL 10*3/UL
PMV BLD AUTO: NORMAL FL
POTASSIUM SERPL-SCNC: NORMAL MMOL/L
RBC # BLD: NORMAL 10*6/UL
SODIUM BLD-SCNC: NORMAL MMOL/L
TOTAL PROTEIN: NORMAL
TRIGL SERPL-MCNC: 37 MG/DL
TSH SERPL DL<=0.05 MIU/L-ACNC: 2.09 UIU/ML
VITAMIN D 25-HYDROXY: 64
VITAMIN D2, 25 HYDROXY: NORMAL
VITAMIN D3,25 HYDROXY: NORMAL
VLDLC SERPL CALC-MCNC: ABNORMAL MG/DL
WBC # BLD: NORMAL 10*3/UL

## 2022-02-23 DIAGNOSIS — M85.80 OSTEOPENIA, UNSPECIFIED LOCATION: Chronic | ICD-10-CM

## 2022-02-23 DIAGNOSIS — M79.7 FIBROMYALGIA: ICD-10-CM

## 2022-06-16 DIAGNOSIS — M85.80 OSTEOPENIA, UNSPECIFIED LOCATION: Chronic | ICD-10-CM

## 2022-06-16 DIAGNOSIS — M79.7 FIBROMYALGIA: ICD-10-CM

## 2022-06-16 RX ORDER — GABAPENTIN 600 MG/1
TABLET ORAL
Qty: 270 TABLET | Refills: 0 | Status: SHIPPED
Start: 2022-06-16 | End: 2022-09-14

## 2022-06-21 ENCOUNTER — OFFICE VISIT (OUTPATIENT)
Dept: FAMILY MEDICINE CLINIC | Age: 57
End: 2022-06-21
Payer: COMMERCIAL

## 2022-06-21 VITALS
HEIGHT: 62 IN | WEIGHT: 129 LBS | DIASTOLIC BLOOD PRESSURE: 67 MMHG | BODY MASS INDEX: 23.74 KG/M2 | HEART RATE: 61 BPM | TEMPERATURE: 97.8 F | SYSTOLIC BLOOD PRESSURE: 116 MMHG | OXYGEN SATURATION: 97 %

## 2022-06-21 DIAGNOSIS — Z01.419 WELL WOMAN EXAM WITH ROUTINE GYNECOLOGICAL EXAM: Primary | ICD-10-CM

## 2022-06-21 DIAGNOSIS — Z12.4 SCREENING FOR MALIGNANT NEOPLASM OF CERVIX: ICD-10-CM

## 2022-06-21 PROCEDURE — 99396 PREV VISIT EST AGE 40-64: CPT

## 2022-06-21 NOTE — PROGRESS NOTES
Mery Bella    SUBJECTIVE:  Fabiano Daniels is a 61 y.o. female here for her annual routine Pap and checkup. Current Outpatient Medications:     gabapentin (NEURONTIN) 600 MG tablet, TAKE ONE TABLET BY MOUTH THREE TIMES DAILY, Disp: 270 tablet, Rfl: 0    mometasone (NASONEX) 50 MCG/ACT nasal spray, 2 sprays by Nasal route daily, Disp: 1 each, Rfl: 0    Omega-3 Fatty Acids (FISH OIL) 1000 MG CAPS, OTC, Disp: 1 capsule, Rfl: 0    Turmeric (QC TUMERIC COMPLEX) 500 MG CAPS, OTC, Disp: 1 capsule, Rfl: 0    Multiple Vitamins-Minerals (MULTIVITAMIN WITH MINERALS) tablet, otc, Disp: 30 tablet, Rfl: 3    vitamin D 1000 UNITS CAPS, Take 1 capsule by mouth daily, Disp: , Rfl:      History:  Doing well. Periods:  Last menstrual period is 38    Sexually active: No   No abdominal or pelvic pain. No dyspareunia. No abnormal vaginal  discharge. Previous Pap smears normal. Last Pap smear > 5 years  No urinary or GI symptoms. Medical/ surgical history and medications reviewed. Allergies: Patient has no known allergies. FDLMP: age 38  No history of breast cancer  No family history of breast, ovarian, endometrial, cervical cancer    ROS:    Feeling well. Still having right ankle pain and swelling. No fevers or chills. No abnormal fatigue. No dyspnea or chest pain on exertion. No abdominal pain, change in bowel habits, black or bloody stools. No urinary tract symptoms. No neurological complaints. GYN ROS: normal menses, no abnormal bleeding, pelvic pain or discharge, no breast pain or new or enlarging lumps on self exam, no vaginal bleeding, no discharge or pelvic pain, no hot flashes. OBJECTIVE:   Vitals:    06/21/22 1333   BP: 116/67   Pulse: 61   Temp: 97.8 °F (36.6 °C)   SpO2: 97%     GEN: Healthy. Oriented x 3 no distress. HEENT: hearing grossly intact, no jaundice, no oral lesions or nasal discharge. Neck: Supple. Thyroid: normal, no goiter.   Breast Exam:  not examined  Abdomen: Soft. No masses. No organomegaly. V&V: Normal female external genitalia. Cervix: No lesions, pap smear normal.  No erosions or polyps visualized  Uterus: Normal size. Adnexa: Free, no masses. Assessment / Plan:    Woody Harmon was seen today for annual exam.    Diagnoses and all orders for this visit:    Screening for malignant neoplasm of cervix  -     PAP SMEAR with HPV cotesting    Well woman exam with routine gynecological exam  -     PAP SMEAR  -    Mammogram pending   - Discussed healthy diet including reducing salt, saturated fats and regular exercise     Other orders  -     Human papillomavirus (HPV) DNA probe thin prep high risk       The patient was informed about the importance of regular gynecological examination and pap smear. After age 48 ,a colonoscopy should be scheduled through her primary care physician (PCP). This will help decrease the risk of colon cancer. During the reproductive years, she should take folic acid daily in order to decrease the risk of neural tube defects such as spina bifida. Adequate calcium and vitamin D intake should be added to a healthy diet, and weight bearing exercise continued daily for improved cardiovascular and bone health. All questions have been answered to the patient's satisfaction.

## 2022-06-21 NOTE — PROGRESS NOTES
S: 61 y.o. female with   Chief Complaint   Patient presents with    Annual Exam     pap smear       No concerns   passed earlier this year  No previous abnormal pap smear, last 5 years ago  LMP age 38  H/o fibroid cyst  No vaginal bleeding  ROS unremarkable  Due for mammogram    O: VS:  height is 5' 2\" (1.575 m) and weight is 129 lb (58.5 kg). Her temperature is 97.8 °F (36.6 °C). Her blood pressure is 116/67 and her pulse is 61. Her oxygen saturation is 97%. BP Readings from Last 3 Encounters:   06/21/22 116/67   02/21/22 124/77   02/05/22 132/62     See resident note  Normal cervix    Impression/Plan:   1) Well woman: +pap smear with HPV co-testing, mammogram to be completed    RTC 3 months for chronic issues      Health Maintenance Due   Topic Date Due    COVID-19 Vaccine (1) Never done    HIV screen  Never done    Hepatitis C screen  Never done    DTaP/Tdap/Td vaccine (1 - Tdap) Never done    Shingles vaccine (1 of 2) Never done    Breast cancer screen  08/04/2017    Cervical cancer screen  08/04/2018         Attending Physician Statement  I have discussed the case, including pertinent history and exam findings with the resident. I also have seen the patient and performed key portions of the examination. I agree with the documented assessment and plan.       Taty Davis MD

## 2022-06-24 LAB
HPV SAMPLE: NORMAL
HPV TYPE 16: NOT DETECTED
HPV TYPE 18: NOT DETECTED
HPV, HIGH RISK OTHER: NOT DETECTED
INTERPRETATION: NORMAL
SOURCE: NORMAL

## 2022-09-14 DIAGNOSIS — M79.7 FIBROMYALGIA: ICD-10-CM

## 2022-09-14 DIAGNOSIS — M85.80 OSTEOPENIA, UNSPECIFIED LOCATION: Chronic | ICD-10-CM

## 2022-09-14 RX ORDER — GABAPENTIN 600 MG/1
TABLET ORAL
Qty: 90 TABLET | Refills: 0 | Status: SHIPPED
Start: 2022-09-14 | End: 2022-09-23 | Stop reason: SDUPTHER

## 2022-09-14 NOTE — TELEPHONE ENCOUNTER
Patient needs refill of gabapentin 600mg sent to [facility] pharmacy last appt 6-21 next appt 9-23-22

## 2022-09-23 ENCOUNTER — OFFICE VISIT (OUTPATIENT)
Dept: FAMILY MEDICINE CLINIC | Age: 57
End: 2022-09-23
Payer: COMMERCIAL

## 2022-09-23 VITALS
HEIGHT: 62 IN | SYSTOLIC BLOOD PRESSURE: 130 MMHG | OXYGEN SATURATION: 98 % | BODY MASS INDEX: 23.92 KG/M2 | RESPIRATION RATE: 16 BRPM | WEIGHT: 130 LBS | TEMPERATURE: 97.8 F | DIASTOLIC BLOOD PRESSURE: 70 MMHG | HEART RATE: 60 BPM

## 2022-09-23 DIAGNOSIS — M85.80 OSTEOPENIA, UNSPECIFIED LOCATION: Chronic | ICD-10-CM

## 2022-09-23 DIAGNOSIS — Z12.31 ENCOUNTER FOR SCREENING MAMMOGRAM FOR BREAST CANCER: Primary | ICD-10-CM

## 2022-09-23 DIAGNOSIS — M79.7 FIBROMYALGIA: ICD-10-CM

## 2022-09-23 PROCEDURE — G8420 CALC BMI NORM PARAMETERS: HCPCS | Performed by: FAMILY MEDICINE

## 2022-09-23 PROCEDURE — 1036F TOBACCO NON-USER: CPT | Performed by: FAMILY MEDICINE

## 2022-09-23 PROCEDURE — 3017F COLORECTAL CA SCREEN DOC REV: CPT | Performed by: FAMILY MEDICINE

## 2022-09-23 PROCEDURE — G8427 DOCREV CUR MEDS BY ELIG CLIN: HCPCS | Performed by: FAMILY MEDICINE

## 2022-09-23 PROCEDURE — 99213 OFFICE O/P EST LOW 20 MIN: CPT | Performed by: FAMILY MEDICINE

## 2022-09-23 RX ORDER — GABAPENTIN 600 MG/1
TABLET ORAL
Qty: 270 TABLET | Refills: 0 | Status: SHIPPED | OUTPATIENT
Start: 2022-09-23 | End: 2022-12-22

## 2022-09-23 NOTE — PROGRESS NOTES
[id number] [facility] Primary [facility]  Family Medicine Residency  Phone: [phone]  Fax: [phone]    Patient:  Haven Mcclelland 61 y.o. female                                 Date of Service: 9/23/22                            Chiefcomplaint:   Chief Complaint   Patient presents with    Follow-up     Mood     Health Maintenance     Flu - declined       Skin Problem     Spots on skin          History of Present Illness: The patient is a 61 y.o. female  presented to the clinic with complaints as above. Mood - coping well with loss of spouse. Has social support group. Overall moods are appropriate for this stage of grief. She is not having suicidal thoughts she is not having thoughts of hurting her self she is engaging in activities that are constructive. Skin lesions-no color changes noted various areas of arms. Fibromyalgia-relatively well controlled with gabapentin dosages no request for changes this time symptoms are stable she is managing it with activity and lifestyle commendation with gabapentin    Review of Systems:   Review of Systems   Respiratory:  Negative for chest tightness and shortness of breath. Cardiovascular:  Negative for chest pain and palpitations. Gastrointestinal:  Negative for abdominal pain. Skin:  Negative for color change. Past Medical History:      Diagnosis Date    Allergic rhinitis     Early menopause     Fibromyalgia     H/O mammogram 2012    Osteopenia 8/8/2013    Pap test, as part of routine gynecological examination 2012    [facility]    Tobacco abuse     age 39 to present    Vulvodynia        Past Surgical History:        Procedure Laterality Date    COLONOSCOPY  5/2013 Nabila Mitchell    normal    LAPAROSCOPY  07/1996    for pelvic pain       Allergies:     Other    Social History:   Social History     Socioeconomic History    Marital status:      Spouse name: Not on file    Number of children: Not on file    Years of education: Not on file    Highest education level: Not on file   Occupational History    Not on file   Tobacco Use    Smoking status: Former     Packs/day: 0.50     Years: 0.50     Pack years: 0.25     Types: E-Cigarettes, Cigarettes     Quit date: 2018     Years since quittin.4    Smokeless tobacco: Former   Substance and Sexual Activity    Alcohol use: Yes    Drug use: No    Sexual activity: Never   Other Topics Concern    Not on file   Social History Narrative    Not on file     Social Determinants of Health     Financial Resource Strain: Low Risk     Difficulty of Paying Living Expenses: Not hard at all   Food Insecurity: No Food Insecurity    Worried About Running Out of Food in the Last Year: Never true    Ran Out of Food in the Last Year: Never true   Transportation Needs: Not on file   Physical Activity: Not on file   Stress: Not on file   Social Connections: Not on file   Intimate Partner Violence: Not on file   Housing Stability: Not on file        Family History:       Problem Relation Age of Onset    Other Mother         chronic pain    Other Father         PE       Physical Exam:    Vitals: /70   Pulse 60   Temp 97.8 °F (36.6 °C)   Resp 16   Ht 5' 2\" (1.575 m)   Wt 130 lb (59 kg)   SpO2 98%   BMI 23.78 kg/m²   BP Readings from Last 3 Encounters:   22 130/70   22 116/67   22 124/77     General Appearance: Well developed, awake, alert, oriented, no acute distressChest wall/Lung: Clear to auscultation bilaterally,  respirations unlabored. No ronchi/wheezing/rales  Heart[de-identified]  Regular rate and rhythm, S1and S2 normal, no murmur, rub or gallop. Skin: Skin color, texture, turgor normal, no rashes. Solar lentigo bilateral upper extremities no seborrheic keratosis noted no concerning skin lesions  Neurologic:   Alert&Oriented. Normal gait and coordination  No focal neurological deficits appreaciated         Psychiatric: has a normal mood and affect. Behavior is normal.       Assessment and Plan:         1. Fibromyalgia  Continue gabapentin we will refill for 90 days symptoms are stable. - gabapentin (NEURONTIN) 600 MG tablet; TAKE ONE TABLET BY MOUTH THREE TIMES DAILY, 90d supply  Dispense: 270 tablet; Refill: 0    2. Osteopenia, unspecified location  Diet and exercise recommended, continue with dietary calcium intake      3. Encounter for screening mammogram for breast cancer  We will screen mammogram  - JOCELIN DIGITAL SCREEN BILATERAL PER PROTOCOL; Future    Mood stable at this time however should things worsen she can contact our office at any time. Return to Office: Return in about 6 months (around 3/23/2023). I encourage further reading and education about your health conditions. Information on many healthconditions is provided by the American Academy of Family Physicians: https://familydoctor. org/  Please bring any questions to me at your next visit. This document may have been prepared at least partiallythrough the use of voice recognition software. Although effort is taken to assure the accuracy of this document, it is possible that grammatical, syntax,  or spelling errors may occur. Medication List:    Current Outpatient Medications   Medication Sig Dispense Refill    gabapentin (NEURONTIN) 600 MG tablet TAKE ONE TABLET BY MOUTH THREE TIMES DAILY, 90d supply 270 tablet 0    mometasone (NASONEX) 50 MCG/ACT nasal spray 2 sprays by Nasal route daily 1 each 0    Omega-3 Fatty Acids (FISH OIL) 1000 MG CAPS OTC 1 capsule 0    Turmeric (QC TUMERIC COMPLEX) 500 MG CAPS OTC 1 capsule 0    Multiple Vitamins-Minerals (MULTIVITAMIN WITH MINERALS) tablet otc 30 tablet 3    vitamin D 1000 UNITS CAPS Take 1 capsule by mouth daily       No current facility-administered medications for this visit.         Radha Garcia MD

## 2022-10-03 ASSESSMENT — ENCOUNTER SYMPTOMS
SHORTNESS OF BREATH: 0
COLOR CHANGE: 0
ABDOMINAL PAIN: 0
CHEST TIGHTNESS: 0

## 2022-11-17 DIAGNOSIS — M79.7 FIBROMYALGIA: ICD-10-CM

## 2022-11-17 RX ORDER — GABAPENTIN 600 MG/1
TABLET ORAL
Qty: 270 TABLET | Refills: 0 | Status: SHIPPED | OUTPATIENT
Start: 2022-12-17 | End: 2023-03-17

## 2022-11-17 NOTE — PROGRESS NOTES
Patient asked about refills that might need to be sent when here with family  member. I renewed her gabapentin, this was the only one that I saw coming due in the next month or so.

## 2023-03-31 ENCOUNTER — TELEMEDICINE (OUTPATIENT)
Dept: FAMILY MEDICINE CLINIC | Age: 58
End: 2023-03-31
Payer: COMMERCIAL

## 2023-03-31 DIAGNOSIS — M79.7 FIBROMYALGIA: ICD-10-CM

## 2023-03-31 DIAGNOSIS — J30.2 SEASONAL ALLERGIC RHINITIS, UNSPECIFIED TRIGGER: ICD-10-CM

## 2023-03-31 PROCEDURE — G8427 DOCREV CUR MEDS BY ELIG CLIN: HCPCS | Performed by: FAMILY MEDICINE

## 2023-03-31 PROCEDURE — 3017F COLORECTAL CA SCREEN DOC REV: CPT | Performed by: FAMILY MEDICINE

## 2023-03-31 PROCEDURE — 99213 OFFICE O/P EST LOW 20 MIN: CPT | Performed by: FAMILY MEDICINE

## 2023-03-31 RX ORDER — MOMETASONE FUROATE 50 UG/1
2 SPRAY, METERED NASAL DAILY
Qty: 1 EACH | Refills: 0 | Status: SHIPPED | OUTPATIENT
Start: 2023-03-31

## 2023-03-31 RX ORDER — GABAPENTIN 600 MG/1
TABLET ORAL
Qty: 270 TABLET | Refills: 0 | Status: SHIPPED | OUTPATIENT
Start: 2023-03-31 | End: 2023-06-29

## 2023-03-31 SDOH — ECONOMIC STABILITY: FOOD INSECURITY: WITHIN THE PAST 12 MONTHS, YOU WORRIED THAT YOUR FOOD WOULD RUN OUT BEFORE YOU GOT MONEY TO BUY MORE.: NEVER TRUE

## 2023-03-31 SDOH — ECONOMIC STABILITY: INCOME INSECURITY: HOW HARD IS IT FOR YOU TO PAY FOR THE VERY BASICS LIKE FOOD, HOUSING, MEDICAL CARE, AND HEATING?: NOT HARD AT ALL

## 2023-03-31 SDOH — ECONOMIC STABILITY: HOUSING INSECURITY
IN THE LAST 12 MONTHS, WAS THERE A TIME WHEN YOU DID NOT HAVE A STEADY PLACE TO SLEEP OR SLEPT IN A SHELTER (INCLUDING NOW)?: NO

## 2023-03-31 SDOH — ECONOMIC STABILITY: FOOD INSECURITY: WITHIN THE PAST 12 MONTHS, THE FOOD YOU BOUGHT JUST DIDN'T LAST AND YOU DIDN'T HAVE MONEY TO GET MORE.: NEVER TRUE

## 2023-03-31 ASSESSMENT — ENCOUNTER SYMPTOMS
CHEST TIGHTNESS: 0
ABDOMINAL PAIN: 0
SHORTNESS OF BREATH: 0

## 2023-03-31 ASSESSMENT — PATIENT HEALTH QUESTIONNAIRE - PHQ9
1. LITTLE INTEREST OR PLEASURE IN DOING THINGS: 0
2. FEELING DOWN, DEPRESSED OR HOPELESS: 0
SUM OF ALL RESPONSES TO PHQ QUESTIONS 1-9: 0
SUM OF ALL RESPONSES TO PHQ9 QUESTIONS 1 & 2: 0

## 2023-03-31 NOTE — PROGRESS NOTES
TeleMedicine Patient Consent    This visit was performed as a virtual video visit using a synchronous, two-way, audio-video telehealth technology platform. Patient identification was verified at the start of the visit, including the patient's telephone number and physical location. I discussed with the patient the nature of our telehealth visits, that:     Due to the nature of an audio- video modality, the only components of a physical exam that could be done are the elements supported by direct observation. The provider will evaluate the patient and recommend diagnostics and treatments based on their assessment. If it was felt that the patient should be evaluated in clinic or an emergency room setting, then they would be directed there. Our sessions are not being recorded and that personal health information is protected. Our team would provide follow up care in person if/when the patient needs it. Patient does agree to proceed with telemedicine consultation. Patient location: home address in [facility]    Physician location: regular office location    This visit was completed virtually using Doxy. me    This visit was performed during the 2500 public health crisis and COVID-19 pandemic.   *Add GT modifier to all Video Visits*

## 2023-03-31 NOTE — PROGRESS NOTES
Jacquie Burr (:  1965) is a Established patient, here for evaluation of the following:      Assessment & Plan   Below is the assessment and plan developed based on review of pertinent history, physical exam, labs, studies, and medications. 1. Fibromyalgia  -     gabapentin (NEURONTIN) 600 MG tablet; TAKE ONE TABLET BY MOUTH THREE TIMES DAILY, 90d supply, Disp-270 tablet, R-0Normal  2. Seasonal allergic rhinitis, unspecified trigger  -     mometasone (NASONEX) 50 MCG/ACT nasal spray; 2 sprays by Nasal route daily, Nasal, DAILY Starting Fri 3/31/2023, Disp-1 each, R-0, Normal    Return in about 6 months (around 2023) for fibromyalgia/allergies. Subjective   HPI  Review of Systems   Respiratory:  Negative for chest tightness and shortness of breath. Cardiovascular:  Negative for chest pain and palpitations. Gastrointestinal:  Negative for abdominal pain. Psychiatric/Behavioral:  Positive for dysphoric mood. Fibromyalgia - sx controlled with gabapentin. Pains are controlled overall. She is not requesting any changes to this. Moderate intensity exercise helps a lot. Tries to keep some exercise, less than a minute every time she goes to the bathroom - it keeps her moving and keeps her doing the exercise. Allergies - nasonex has worked well for allergy control. In the past she has not achieved effective control with antihistamines, they do help some. Flonase has caused side effects and is not tolerable. Allergy sx worsen spring/fall for her and she needs this nasonex seasonally. Moods - dealing with a lot of stressors including with children. She has a counselor and friends to help her cope. At this time she feels she is holding things together. She is not seeking additional intervention at this time. Has been using CBD gummies with melatonin. This has been helpful for sleep. She is very much leaning on her Spiritism community and friend [place].       Objective

## 2023-07-18 DIAGNOSIS — M79.7 FIBROMYALGIA: ICD-10-CM

## 2023-07-19 RX ORDER — GABAPENTIN 600 MG/1
TABLET ORAL
Qty: 270 TABLET | Refills: 0 | Status: SHIPPED | OUTPATIENT
Start: 2023-07-19 | End: 2023-10-17

## 2023-10-05 ENCOUNTER — OFFICE VISIT (OUTPATIENT)
Dept: FAMILY MEDICINE CLINIC | Age: 58
End: 2023-10-05
Payer: COMMERCIAL

## 2023-10-05 VITALS
SYSTOLIC BLOOD PRESSURE: 122 MMHG | BODY MASS INDEX: 21.35 KG/M2 | WEIGHT: 116 LBS | RESPIRATION RATE: 18 BRPM | OXYGEN SATURATION: 98 % | HEART RATE: 58 BPM | DIASTOLIC BLOOD PRESSURE: 64 MMHG | HEIGHT: 62 IN

## 2023-10-05 DIAGNOSIS — J30.2 SEASONAL ALLERGIC RHINITIS, UNSPECIFIED TRIGGER: ICD-10-CM

## 2023-10-05 DIAGNOSIS — Z13.220 SCREENING, LIPID: ICD-10-CM

## 2023-10-05 DIAGNOSIS — Z12.12 SCREENING FOR COLORECTAL CANCER: ICD-10-CM

## 2023-10-05 DIAGNOSIS — M79.7 FIBROMYALGIA: ICD-10-CM

## 2023-10-05 DIAGNOSIS — M85.80 OSTEOPENIA, UNSPECIFIED LOCATION: Chronic | ICD-10-CM

## 2023-10-05 DIAGNOSIS — Z12.31 ENCOUNTER FOR SCREENING MAMMOGRAM FOR BREAST CANCER: Primary | ICD-10-CM

## 2023-10-05 DIAGNOSIS — Z12.11 SCREENING FOR COLORECTAL CANCER: ICD-10-CM

## 2023-10-05 DIAGNOSIS — E55.9 VITAMIN D DEFICIENCY: ICD-10-CM

## 2023-10-05 PROCEDURE — G8484 FLU IMMUNIZE NO ADMIN: HCPCS | Performed by: FAMILY MEDICINE

## 2023-10-05 PROCEDURE — 1036F TOBACCO NON-USER: CPT | Performed by: FAMILY MEDICINE

## 2023-10-05 PROCEDURE — G8427 DOCREV CUR MEDS BY ELIG CLIN: HCPCS | Performed by: FAMILY MEDICINE

## 2023-10-05 PROCEDURE — G8420 CALC BMI NORM PARAMETERS: HCPCS | Performed by: FAMILY MEDICINE

## 2023-10-05 PROCEDURE — 3017F COLORECTAL CA SCREEN DOC REV: CPT | Performed by: FAMILY MEDICINE

## 2023-10-05 PROCEDURE — 99214 OFFICE O/P EST MOD 30 MIN: CPT | Performed by: FAMILY MEDICINE

## 2023-10-05 RX ORDER — MOMETASONE FUROATE 50 UG/1
2 SPRAY, METERED NASAL DAILY
Qty: 1 EACH | Refills: 0 | Status: SHIPPED | OUTPATIENT
Start: 2023-10-05

## 2023-10-05 RX ORDER — GABAPENTIN 600 MG/1
TABLET ORAL
Qty: 270 TABLET | Refills: 0 | Status: SHIPPED | OUTPATIENT
Start: 2023-10-05 | End: 2024-01-03

## 2023-10-10 ASSESSMENT — ENCOUNTER SYMPTOMS
ABDOMINAL PAIN: 0
SHORTNESS OF BREATH: 0
CHEST TIGHTNESS: 0

## 2023-10-11 ENCOUNTER — HOSPITAL ENCOUNTER (OUTPATIENT)
Dept: MAMMOGRAPHY | Age: 58
Discharge: HOME | End: 2023-10-13
Payer: COMMERCIAL

## 2023-10-11 VITALS — WEIGHT: 115 LBS | HEIGHT: 63 IN | BODY MASS INDEX: 20.38 KG/M2

## 2023-10-11 DIAGNOSIS — Z12.31 ENCOUNTER FOR SCREENING MAMMOGRAM FOR BREAST CANCER: ICD-10-CM

## 2023-10-11 PROCEDURE — 77063 BREAST TOMOSYNTHESIS BI: CPT

## 2023-11-16 ENCOUNTER — OFFICE VISIT (OUTPATIENT)
Dept: SURGERY | Age: 58
End: 2023-11-16

## 2023-11-16 VITALS
OXYGEN SATURATION: 99 % | DIASTOLIC BLOOD PRESSURE: 79 MMHG | SYSTOLIC BLOOD PRESSURE: 126 MMHG | TEMPERATURE: 97.1 F | BODY MASS INDEX: 20.91 KG/M2 | HEIGHT: 63 IN | WEIGHT: 118 LBS | HEART RATE: 52 BPM

## 2023-11-16 DIAGNOSIS — Z12.11 ENCOUNTER FOR SCREENING COLONOSCOPY: Primary | ICD-10-CM

## 2023-11-16 NOTE — PROGRESS NOTES
[facility] Surgery Clinic Note    Assessment/Plan:      Diagnosis Orders   1. Encounter for screening colonoscopy      We will plan for colonoscopy. Return for Colonoscopy. Chief Complaint   Patient presents with    New Patient     Colonoscopy screening       PCP: Soha Fay MD    HPI: Flako Michel is a 60 y.o. female who presents in consultation for, however there is notes from her PCP stating the last 1 was 10 years ago in 2013. She says it was normal.  She denies any history she has no problems moving her bowels. There is no blood in the stool. There is no abdominal pain. There is no family history of colon cancer or inflammatory bowel disease. Reports that however her  did pass from rectal cancer. Past Medical History:   Diagnosis Date    Allergic rhinitis     Early menopause     Fibromyalgia     H/O mammogram 2012    Osteopenia 8/8/2013    Pap test, as part of routine gynecological examination 2012    [facility]    Tobacco abuse     age 39 to present    Vulvodynia        Past Surgical History:   Procedure Laterality Date    COLONOSCOPY  5/2013 Donnamae Footman    normal    LAPAROSCOPY  07/1996    for pelvic pain       Prior to Admission medications    Medication Sig Start Date End Date Taking?  Authorizing Provider   gabapentin (NEURONTIN) 600 MG tablet TAKE ONE TABLET BY MOUTH THREE TIMES DAILY, 90d supply 10/5/23 1/3/24 Yes Soha Fay MD   mometasone (NASONEX) 50 MCG/ACT nasal spray 2 sprays by Nasal route daily 10/5/23  Yes Soha Fay MD   Omega-3 Fatty Acids (FISH OIL) 1000 MG CAPS OTC 12/4/20  Yes Soha Fay MD   Turmeric (QC TUMERIC COMPLEX) 500 MG CAPS OTC 12/4/20  Yes Soha Fay MD   Multiple Vitamins-Minerals (MULTIVITAMIN WITH MINERALS) tablet otc 12/4/20  Yes Soha Fay MD   vitamin D 1000 UNITS CAPS Take 1 capsule by mouth daily   Yes Provider, MD Gilbert       Allergies   Allergen Reactions    Other      [facility]

## 2023-11-28 ENCOUNTER — PREP FOR PROCEDURE (OUTPATIENT)
Dept: SURGERY | Age: 58
End: 2023-11-28

## 2023-11-28 DIAGNOSIS — Z12.11 COLON CANCER SCREENING: ICD-10-CM

## 2023-12-27 ENCOUNTER — TELEPHONE (OUTPATIENT)
Dept: SURGERY | Age: 58
End: 2023-12-27

## 2023-12-27 NOTE — TELEPHONE ENCOUNTER
Prior Authorization Form:      DEMOGRAPHICS:                     Patient Name:  Marcos Montes De Oca  Patient :  1965            Insurance:  Payor: [employer] / Plan: [employer] [address] / Product Type: *No Product type* /   Insurance ID Number:    Payer/Plan Subscr  Sex Relation Sub. Ins. ID Effective Group Num   1.  [address] [facility] 1965 Female Self [id number]                                   [address]         DIAGNOSIS & PROCEDURE:                       Procedure/Operation: colon            CPT Code: 19448    Diagnosis:  screening    ICD10 Code: z12.11    Location:  [facility]    Surgeon:  Tavares Henry INFORMATION:                          Date: 24    Time: 1030am              Anesthesia:  MAC/TIVA                                                       Status:  Outpatient        Special Comments:         Electronically signed by Elie Thompson MA on 2023 at 2:05 PM

## 2023-12-27 NOTE — TELEPHONE ENCOUNTER
Hayden Natalie is scheduled for colon with Dr Ortega Merrill on 4/30/24 at 1030am. Patient needs to be NPO after midnight the night before procedure. All surgery instructions were explained to the patient and a surgery letter was also mailed out. MA informed patient that PAT will also be calling to review pre-op instructions and medications. Patient verbalized understanding.

## 2023-12-28 PROBLEM — Z12.11 COLON CANCER SCREENING: Status: RESOLVED | Noted: 2023-11-28 | Resolved: 2023-12-28

## 2024-01-09 DIAGNOSIS — M79.7 FIBROMYALGIA: ICD-10-CM

## 2024-01-09 RX ORDER — GABAPENTIN 600 MG/1
TABLET ORAL
Qty: 270 TABLET | Refills: 0 | Status: SHIPPED | OUTPATIENT
Start: 2024-01-09 | End: 2024-04-08

## 2024-04-05 DIAGNOSIS — M79.7 FIBROMYALGIA: ICD-10-CM

## 2024-04-05 RX ORDER — GABAPENTIN 600 MG/1
TABLET ORAL
Qty: 270 TABLET | Refills: 0 | Status: CANCELLED | OUTPATIENT
Start: 2024-04-05 | End: 2024-07-04

## 2024-04-05 RX ORDER — GABAPENTIN 600 MG/1
TABLET ORAL
Qty: 270 TABLET | Refills: 0 | Status: SHIPPED | OUTPATIENT
Start: 2024-04-05 | End: 2024-07-04

## 2024-04-05 NOTE — TELEPHONE ENCOUNTER
Last Appointment   10/5/2023  Next Appointment  7/5/2024   Electrodesiccation Text: The wound bed was treated with electrodesiccation after the biopsy was performed. Bill For Surgical Tray: no Additional Anesthesia Volume In Cc (Will Not Render If 0): 0 Dressing: bandage Consent: Written consent was obtained and risks were reviewed including but not limited to scarring, infection, bleeding, scabbing, incomplete removal, nerve damage and allergy to anesthesia. Post-Care Instructions: I reviewed with the patient in detail post-care instructions. Patient is to keep the biopsy site dry overnight, and then apply bacitracin twice daily until healed. Patient may apply hydrogen peroxide soaks to remove any crusting. Electrodesiccation And Curettage Text: The wound bed was treated with electrodesiccation and curettage after the biopsy was performed. Lab: [facility] Depth Of Biopsy: dermis Detail Level: Detailed Was A Bandage Applied: Yes Anesthesia Type: 1% lidocaine with epinephrine Lab Facility: [facility] Size Of Lesion In Cm: 0.7 Notification Instructions: Patient will be notified of biopsy results. However, patient instructed to call the office if not contacted within 2 weeks. Curettage Text: The wound bed was treated with curettage after the biopsy was performed. Type Of Destruction Used: Electrodesiccation and Curettage Silver Nitrate Text: The wound bed was treated with silver nitrate after the biopsy was performed. Cryotherapy Text: The wound bed was treated with cryotherapy after the biopsy was performed. Billing Type: [employer] Biopsy Method: Dermablade Wound Care: Bacitracin Hemostasis: Drysol Anesthesia Volume In Cc (Will Not Render If 0): 0.5 Biopsy Type: H and E

## 2024-04-25 NOTE — PROGRESS NOTES
[facility] PRE-ADMISSION TESTING INSTRUCTIONS    The Preadmission Testing patient is instructed accordingly using the following criteria (check applicable):    ARRIVAL INSTRUCTIONS:  [x] Parking the day of Surgery is located in the Main Entrance lot.  Upon entering the door, make an immediate right to the surgery reception desk    [x] Bring photo ID and insurance card    [] Bring in a copy of Living will or Durable Power of  papers.    [x] Please be sure to arrange for a responsible adult to provide transportation to and from the hospital    [x] Please arrange for a responsible adult to be with you for the 24 hour period post procedure due to having anesthesia    [x] If you awake am of surgery not feeling well or have temperature >100 please call [phone]    GENERAL INSTRUCTIONS:    [x] No solid foods after midnight, may have up to 8oz of water until 4 hours prior to surgery. No gum, no candy, no mints. NPO time: 0700       [x] You may brush your teeth, do not swallow any toothpaste    [x] Take medications as instructed     [x] Stop herbal supplements and vitamins 5 days prior to procedure    [x] Follow preop dosing of blood thinners per physician instructions    [] Take 1/2 dose of evening insulin, but no insulin after midnight    [] No oral diabetic medications after midnight    [] If diabetic and have low blood sugar or feel symptomatic, take 1-2oz apple juice only    [] Bring inhalers day of surgery    [] Bring urine specimen day of surgery    [x] Shower or bath with soap, lather and rinse well, AM of Surgery, no lotion, powders or creams to surgical site    [x] Follow bowel prep as instructed per surgeon    [x] No tobacco products within 24 hours of surgery     [x] No alcohol or illegal drug use, marijuana included, within 24 hours of surgery.    [x] Jewelry, body piercing's, eyeglasses, contact lenses and dentures are not permitted into surgery (bring cases)      [x]  Please do not wear any nail polish, make up or hair products on the day of surgery    [x] You can expect a call the business day prior to procedure to notify you if your arrival time changes    [x] If you receive a survey after surgery we would greatly appreciate your comments    [] Parent/guardian of a minor must accompany their child and remain on the premises  the entire time they are under our care     [] Pediatric patients may bring favorite toy, blanket or comfort item with them    [] A caregiver or family member must remain with the patient during their stay if they are mentally handicapped, have dementia, disoriented or unable to use a call light or would be a safety concern if left unattended    [x] Please notify surgeon if you develop any illness between now and time of surgery (cold, cough, sore throat, fever, nausea, vomiting) or any signs of infections  including skin, wounds, and dental.    [x]  The Outpatient Pharmacy is available to fill your prescription here on your day of surgery, ask your preop nurse for details

## 2024-04-29 ENCOUNTER — ANESTHESIA EVENT (OUTPATIENT)
Dept: ENDOSCOPY | Age: 59
End: 2024-04-29
Payer: COMMERCIAL

## 2024-04-29 ASSESSMENT — LIFESTYLE VARIABLES: SMOKING_STATUS: 1

## 2024-04-29 NOTE — ANESTHESIA PRE PROCEDURE
Department of Anesthesiology  Preprocedure Note       Name:  Goldie Bynum   Age:  58 y.o.  :  1965                                          MRN:  [id number]         Date:  2024      Surgeon: Surgeon(s):  Miguelangel Ron MD    Procedure: Procedure(s):  COLORECTAL CANCER SCREENING, NOT HIGH RISK    Medications prior to admission:   Prior to Admission medications    Medication Sig Start Date End Date Taking? Authorizing Provider   gabapentin (NEURONTIN) 600 MG tablet TAKE ONE TABLET BY MOUTH THREE TIMES DAILY 24  Cm Waddell MD   mometasone (NASONEX) 50 MCG/ACT nasal spray 2 sprays by Nasal route daily  Patient not taking: Reported on 2024 10/5/23   Cm Waddell MD   Omega-3 Fatty Acids (FISH OIL) 1000 MG CAPS OTC 20   Cm Waddell MD   Turmeric (QC TUMERIC COMPLEX) 500 MG CAPS OTC 20   Cm Waddell MD   Multiple Vitamins-Minerals (MULTIVITAMIN WITH MINERALS) tablet otc 20   Bairon, Cm HENSON MD   vitamin D 1000 UNITS CAPS Take 1 capsule by mouth daily    Provider, MD Gilbert       Current medications:    No current facility-administered medications for this encounter.     Current Outpatient Medications   Medication Sig Dispense Refill   • gabapentin (NEURONTIN) 600 MG tablet TAKE ONE TABLET BY MOUTH THREE TIMES DAILY 270 tablet 0   • mometasone (NASONEX) 50 MCG/ACT nasal spray 2 sprays by Nasal route daily (Patient not taking: Reported on 2024) 1 each 0   • Omega-3 Fatty Acids (FISH OIL) 1000 MG CAPS OTC 1 capsule 0   • Turmeric (QC TUMERIC COMPLEX) 500 MG CAPS OTC 1 capsule 0   • Multiple Vitamins-Minerals (MULTIVITAMIN WITH MINERALS) tablet otc 30 tablet 3   • vitamin D 1000 UNITS CAPS Take 1 capsule by mouth daily         Allergies:    Allergies   Allergen Reactions   • Other      [place] JELLY       Problem List:    Patient Active Problem List   Diagnosis Code   • Fibromyalgia M79.7   • Vulvodynia N94.819   • Allergic rhinitis J30.9

## 2024-04-30 ENCOUNTER — ANESTHESIA (OUTPATIENT)
Dept: ENDOSCOPY | Age: 59
End: 2024-04-30
Payer: COMMERCIAL

## 2024-04-30 ENCOUNTER — HOSPITAL ENCOUNTER (OUTPATIENT)
Age: 59
Setting detail: OUTPATIENT SURGERY
Discharge: HOME | End: 2024-04-30
Attending: SURGERY | Admitting: SURGERY
Payer: COMMERCIAL

## 2024-04-30 VITALS
HEIGHT: 62 IN | WEIGHT: 115 LBS | HEART RATE: 45 BPM | RESPIRATION RATE: 16 BRPM | TEMPERATURE: 97.5 F | DIASTOLIC BLOOD PRESSURE: 61 MMHG | OXYGEN SATURATION: 98 % | BODY MASS INDEX: 21.16 KG/M2 | SYSTOLIC BLOOD PRESSURE: 104 MMHG

## 2024-04-30 DIAGNOSIS — Z12.11 COLON CANCER SCREENING: ICD-10-CM

## 2024-04-30 PROCEDURE — 45380 COLONOSCOPY AND BIOPSY: CPT | Performed by: SURGERY

## 2024-04-30 PROCEDURE — 88305 TISSUE EXAM BY PATHOLOGIST: CPT

## 2024-04-30 PROCEDURE — 6360000002 HC RX W HCPCS: Performed by: NURSE ANESTHETIST, CERTIFIED REGISTERED

## 2024-04-30 PROCEDURE — 3609010300 HC COLONOSCOPY W/BIOPSY SINGLE/MULTIPLE: Performed by: SURGERY

## 2024-04-30 PROCEDURE — 3700000001 HC ADD 15 MINUTES (ANESTHESIA): Performed by: SURGERY

## 2024-04-30 PROCEDURE — 2709999900 HC NON-CHARGEABLE SUPPLY: Performed by: SURGERY

## 2024-04-30 PROCEDURE — 2580000003 HC RX 258: Performed by: NURSE ANESTHETIST, CERTIFIED REGISTERED

## 2024-04-30 PROCEDURE — 7100000010 HC PHASE II RECOVERY - FIRST 15 MIN: Performed by: SURGERY

## 2024-04-30 PROCEDURE — 7100000011 HC PHASE II RECOVERY - ADDTL 15 MIN: Performed by: SURGERY

## 2024-04-30 PROCEDURE — 3700000000 HC ANESTHESIA ATTENDED CARE: Performed by: SURGERY

## 2024-04-30 RX ORDER — SODIUM CHLORIDE 0.9 % (FLUSH) 0.9 %
5-40 SYRINGE (ML) INJECTION PRN
Status: DISCONTINUED | OUTPATIENT
Start: 2024-04-30 | End: 2024-04-30 | Stop reason: HOSPADM

## 2024-04-30 RX ORDER — SODIUM CHLORIDE 9 MG/ML
INJECTION, SOLUTION INTRAVENOUS PRN
Status: DISCONTINUED | OUTPATIENT
Start: 2024-04-30 | End: 2024-04-30 | Stop reason: HOSPADM

## 2024-04-30 RX ORDER — NALOXONE HYDROCHLORIDE 0.4 MG/ML
INJECTION, SOLUTION INTRAMUSCULAR; INTRAVENOUS; SUBCUTANEOUS PRN
Status: DISCONTINUED | OUTPATIENT
Start: 2024-04-30 | End: 2024-04-30 | Stop reason: HOSPADM

## 2024-04-30 RX ORDER — SODIUM CHLORIDE, SODIUM LACTATE, POTASSIUM CHLORIDE, CALCIUM CHLORIDE 600; 310; 30; 20 MG/100ML; MG/100ML; MG/100ML; MG/100ML
INJECTION, SOLUTION INTRAVENOUS CONTINUOUS PRN
Status: DISCONTINUED | OUTPATIENT
Start: 2024-04-30 | End: 2024-04-30 | Stop reason: SDUPTHER

## 2024-04-30 RX ORDER — ONDANSETRON 2 MG/ML
4 INJECTION INTRAMUSCULAR; INTRAVENOUS
Status: DISCONTINUED | OUTPATIENT
Start: 2024-04-30 | End: 2024-04-30 | Stop reason: HOSPADM

## 2024-04-30 RX ORDER — PROPOFOL 10 MG/ML
INJECTION, EMULSION INTRAVENOUS PRN
Status: DISCONTINUED | OUTPATIENT
Start: 2024-04-30 | End: 2024-04-30 | Stop reason: SDUPTHER

## 2024-04-30 RX ORDER — FENTANYL CITRATE 50 UG/ML
25 INJECTION, SOLUTION INTRAMUSCULAR; INTRAVENOUS EVERY 5 MIN PRN
Status: DISCONTINUED | OUTPATIENT
Start: 2024-04-30 | End: 2024-04-30 | Stop reason: HOSPADM

## 2024-04-30 RX ORDER — SODIUM CHLORIDE 0.9 % (FLUSH) 0.9 %
5-40 SYRINGE (ML) INJECTION EVERY 12 HOURS SCHEDULED
Status: DISCONTINUED | OUTPATIENT
Start: 2024-04-30 | End: 2024-04-30 | Stop reason: HOSPADM

## 2024-04-30 RX ORDER — MIDAZOLAM HYDROCHLORIDE 1 MG/ML
INJECTION INTRAMUSCULAR; INTRAVENOUS PRN
Status: DISCONTINUED | OUTPATIENT
Start: 2024-04-30 | End: 2024-04-30 | Stop reason: SDUPTHER

## 2024-04-30 RX ADMIN — PROPOFOL 200 MG: 10 INJECTION, EMULSION INTRAVENOUS at 11:04

## 2024-04-30 RX ADMIN — SODIUM CHLORIDE, POTASSIUM CHLORIDE, SODIUM LACTATE AND CALCIUM CHLORIDE: 600; 310; 30; 20 INJECTION, SOLUTION INTRAVENOUS at 11:03

## 2024-04-30 RX ADMIN — MIDAZOLAM 2 MG: 1 INJECTION INTRAMUSCULAR; INTRAVENOUS at 11:03

## 2024-04-30 RX ADMIN — PROPOFOL 50 MG: 10 INJECTION, EMULSION INTRAVENOUS at 11:14

## 2024-04-30 NOTE — ANESTHESIA POSTPROCEDURE EVALUATION
Department of Anesthesiology  Postprocedure Note    Patient: Goldie Bynum  MRN: [id number]  YOB: 1965  Date of evaluation: 4/30/2024    Procedure Summary       Date: 04/30/24 Room / Location: [place] [address] / [facility]    Anesthesia Start: 1103 Anesthesia Stop: 1124    Procedure: COLONOSCOPY BIOPSY Diagnosis:       Colon cancer screening      (Colon cancer screening [Z12.11])    Surgeons: Miguelangel Ron MD Responsible Provider: Kavon Temple MD    Anesthesia Type: MAC ASA Status: 2            Anesthesia Type: MAC    Olivia Phase I: Olivia Score: 10    Olivia Phase II: Olivia Score: 10    Anesthesia Post Evaluation    Patient location during evaluation: PACU  Patient participation: complete - patient participated  Level of consciousness: awake and alert  Pain score: 2  Airway patency: patent  Nausea & Vomiting: no nausea and no vomiting  Cardiovascular status: blood pressure returned to baseline  Respiratory status: acceptable  Hydration status: euvolemic  Pain management: adequate    No notable events documented.

## 2024-04-30 NOTE — H&P
UNITS CAPS Take 1 capsule by mouth daily     Yes Provider, Gilbert, MD                  Allergies   Allergen Reactions    Other         [place] NITISH         Social History   Social History            Socioeconomic History    Marital status:        Spouse name: None    Number of children: None    Years of education: None    Highest education level: None   Tobacco Use    Smoking status: Former       Packs/day: 0.50       Years: 0.50       Additional pack years: 0.00       Total pack years: 0.25       Types: E-Cigarettes, Cigarettes       Quit date: 2018       Years since quittin.5    Smokeless tobacco: Former   Substance and Sexual Activity    Alcohol use: Yes    Drug use: No    Sexual activity: Never      Social Determinants of Health           Financial Resource Strain: Low Risk  (3/31/2023)     Overall Financial Resource Strain (CARDIA)      Difficulty of Paying Living Expenses: Not hard at all   Food Insecurity: No Food Insecurity (3/31/2023)     Hunger Vital Sign      Worried About Running Out of Food in the Last Year: Never true      Ran Out of Food in the Last Year: Never true   Transportation Needs: Unknown (3/31/2023)     PRAPARE - Transportation      Lack of Transportation (Non-Medical): No   Housing Stability: Unknown (3/31/2023)     Housing Stability Vital Sign      Unstable Housing in the Last Year: No            Family History         Family History   Problem Relation Age of Onset    Other Mother           chronic pain    Other Father           PE            Review of Systems   All other systems reviewed and are negative.                  Objective:  Vitals       Vitals:     23 1407   BP: 126/79   Pulse: 52   Temp: 97.1 °F (36.2 °C)   SpO2: 99%   Weight: 53.5 kg (118 lb)   Height: 1.588 m (5' 2.5\")         Body mass index is 21.24 kg/m².        Physical Exam  HENT:      Head: Normocephalic and atraumatic.   Eyes:      General:         Right eye: No discharge.         Left eye: No  discharge.   Neck:      Trachea: No tracheal deviation.   Cardiovascular:      Rate and Rhythm: Normal rate.   Pulmonary:      Effort: Pulmonary effort is normal. No respiratory distress.   Abdominal:      General: There is no distension.      Palpations: Abdomen is soft.      Tenderness: There is no guarding or rebound.   Skin:     General: Skin is warm and dry.   Neurological:      Mental Status: She is alert and oriented to person, place, and time.            CBC:         Lab Results   Component Value Date/Time     WBC 7.8 09/04/2018 05:20 PM     RBC 4.18 09/04/2018 05:20 PM     HGB 13.0 09/04/2018 05:20 PM     HCT 39.3 09/04/2018 05:20 PM     MCV 94.0 09/04/2018 05:20 PM     MCH 31.1 09/04/2018 05:20 PM     MCHC 33.1 09/04/2018 05:20 PM     RDW 13.2 09/04/2018 05:20 PM      09/04/2018 05:20 PM     MPV 9.8 09/04/2018 05:20 PM      CMP:          Lab Results   Component Value Date/Time      07/06/2016 10:40 AM     K 5.0 07/06/2016 10:40 AM      07/06/2016 10:40 AM     CO2 28 07/06/2016 10:40 AM     BUN 15 07/06/2016 10:40 AM     CREATININE 0.9 07/06/2016 10:40 AM     GFRAA >60 07/06/2016 10:40 AM     LABGLOM >60 07/06/2016 10:40 AM     GLUCOSE 98 07/06/2016 10:40 AM     PROT 7.3 07/06/2016 10:40 AM     LABALBU 4.5 07/06/2016 10:40 AM     CALCIUM 10.0 07/06/2016 10:40 AM     BILITOT 0.4 07/06/2016 10:40 AM     ALKPHOS 29 07/06/2016 10:40 AM     AST 22 07/06/2016 10:40 AM     ALT 14 07/06/2016 10:40 AM      PT/INR:  No results found for: \"PROTIME\", \"INR\"  HgBA1c:  No results found for: \"LABA1C\"  LIPASE:  No results found for: \"LIPASE\"         Miguelangel Ron MD     I have examined the patient and performed the key aspects of physical exam, and reviewed the record (including laboratory findings, results, and all pertinent radiology images, which are independently reviewed and interpreted unless otherwise explicitly stated).  The referring provider's notes were also reviewed.     Any procedures

## 2024-05-03 LAB — SURGICAL PATHOLOGY REPORT: NORMAL

## 2024-05-16 ENCOUNTER — OFFICE VISIT (OUTPATIENT)
Dept: SURGERY | Age: 59
End: 2024-05-16
Payer: COMMERCIAL

## 2024-05-16 VITALS
WEIGHT: 115 LBS | HEART RATE: 59 BPM | DIASTOLIC BLOOD PRESSURE: 77 MMHG | SYSTOLIC BLOOD PRESSURE: 114 MMHG | HEIGHT: 62 IN | OXYGEN SATURATION: 97 % | BODY MASS INDEX: 21.16 KG/M2 | TEMPERATURE: 98.2 F

## 2024-05-16 DIAGNOSIS — K57.30 DIVERTICULOSIS OF LARGE INTESTINE WITHOUT HEMORRHAGE: ICD-10-CM

## 2024-05-16 DIAGNOSIS — K63.5 COLORECTAL POLYP DETECTED ON COLONOSCOPY: Primary | ICD-10-CM

## 2024-05-16 PROCEDURE — 1036F TOBACCO NON-USER: CPT | Performed by: SURGERY

## 2024-05-16 PROCEDURE — 3017F COLORECTAL CA SCREEN DOC REV: CPT | Performed by: SURGERY

## 2024-05-16 PROCEDURE — G8427 DOCREV CUR MEDS BY ELIG CLIN: HCPCS | Performed by: SURGERY

## 2024-05-16 PROCEDURE — 99212 OFFICE O/P EST SF 10 MIN: CPT | Performed by: SURGERY

## 2024-05-16 PROCEDURE — G8420 CALC BMI NORM PARAMETERS: HCPCS | Performed by: SURGERY

## 2024-05-16 RX ORDER — PREDNISOLONE ACETATE 10 MG/ML
SUSPENSION/ DROPS OPHTHALMIC
COMMUNITY
Start: 2024-04-11

## 2024-05-20 NOTE — PROGRESS NOTES
well as medication and allergy review, were completed and are as documented elsewhere in the chart.          Objective:  Vitals:    05/16/24 1541   BP: 114/77   Pulse: 59   Temp: 98.2 °F (36.8 °C)   SpO2: 97%   Weight: 52.2 kg (115 lb)   Height: 1.575 m (5' 2\")        Body mass index is 21.03 kg/m².    Physical Exam  Constitutional:       General: She is not in acute distress.     Appearance: She is not diaphoretic.   Cardiovascular:      Rate and Rhythm: Normal rate.   Pulmonary:      Effort: Pulmonary effort is normal. No respiratory distress.   Abdominal:      General: There is no distension.      Palpations: Abdomen is soft.      Tenderness: There is no guarding or rebound.         CBC:   Lab Results   Component Value Date/Time    WBC 7.8 09/04/2018 05:20 PM    RBC 4.18 09/04/2018 05:20 PM    HGB 13.0 09/04/2018 05:20 PM    HCT 39.3 09/04/2018 05:20 PM    MCV 94.0 09/04/2018 05:20 PM    MCH 31.1 09/04/2018 05:20 PM    MCHC 33.1 09/04/2018 05:20 PM    RDW 13.2 09/04/2018 05:20 PM     09/04/2018 05:20 PM    MPV 9.8 09/04/2018 05:20 PM     CMP:    Lab Results   Component Value Date/Time     07/06/2016 10:40 AM    K 5.0 07/06/2016 10:40 AM     07/06/2016 10:40 AM    CO2 28 07/06/2016 10:40 AM    BUN 15 07/06/2016 10:40 AM    CREATININE 0.9 07/06/2016 10:40 AM    GFRAA >60 07/06/2016 10:40 AM    LABGLOM >60 07/06/2016 10:40 AM    GLUCOSE 98 07/06/2016 10:40 AM    CALCIUM 10.0 07/06/2016 10:40 AM    BILITOT 0.4 07/06/2016 10:40 AM    ALKPHOS 29 07/06/2016 10:40 AM    AST 22 07/06/2016 10:40 AM    ALT 14 07/06/2016 10:40 AM     PT/INR:  No results found for: \"PROTIME\", \"INR\"  HgBA1c:  No results found for: \"LABA1C\"  LIPASE:  No results found for: \"LIPASE\"       Miguelangel Ron MD      I have examined the patient and performed the key aspects of the physical exam,and reviewed the record (including laboratory findings, results, and all pertinent radiology images, which are independently reviewed and

## 2024-07-10 DIAGNOSIS — M79.7 FIBROMYALGIA: ICD-10-CM

## 2024-07-10 RX ORDER — GABAPENTIN 600 MG/1
TABLET ORAL
Qty: 270 TABLET | Refills: 0 | Status: SHIPPED | OUTPATIENT
Start: 2024-07-10 | End: 2024-10-10

## 2024-07-11 ENCOUNTER — OFFICE VISIT (OUTPATIENT)
Dept: FAMILY MEDICINE CLINIC | Age: 59
End: 2024-07-11
Payer: COMMERCIAL

## 2024-07-11 VITALS
HEIGHT: 62 IN | OXYGEN SATURATION: 99 % | DIASTOLIC BLOOD PRESSURE: 84 MMHG | BODY MASS INDEX: 21.35 KG/M2 | RESPIRATION RATE: 17 BRPM | TEMPERATURE: 97.9 F | WEIGHT: 116 LBS | HEART RATE: 58 BPM | SYSTOLIC BLOOD PRESSURE: 132 MMHG

## 2024-07-11 DIAGNOSIS — Z13.220 SCREENING, LIPID: ICD-10-CM

## 2024-07-11 DIAGNOSIS — E55.9 VITAMIN D DEFICIENCY: ICD-10-CM

## 2024-07-11 DIAGNOSIS — L29.9 ITCH OF SKIN: Primary | ICD-10-CM

## 2024-07-11 DIAGNOSIS — M79.7 FIBROMYALGIA: ICD-10-CM

## 2024-07-11 DIAGNOSIS — M85.80 OSTEOPENIA, UNSPECIFIED LOCATION: Chronic | ICD-10-CM

## 2024-07-11 LAB
ALBUMIN: 4.3 G/DL (ref 3.5–5.2)
ALP BLD-CCNC: 31 U/L (ref 35–104)
ALT SERPL-CCNC: 14 U/L (ref 0–32)
ANION GAP SERPL CALCULATED.3IONS-SCNC: 12 MMOL/L (ref 7–16)
AST SERPL-CCNC: 22 U/L (ref 0–31)
BILIRUB SERPL-MCNC: 0.4 MG/DL (ref 0–1.2)
BUN BLDV-MCNC: 13 MG/DL (ref 6–20)
CALCIUM SERPL-MCNC: 9.8 MG/DL (ref 8.6–10.2)
CHLORIDE BLD-SCNC: 106 MMOL/L (ref 98–107)
CHOLESTEROL, FASTING: 185 MG/DL
CO2: 25 MMOL/L (ref 22–29)
CREAT SERPL-MCNC: 0.8 MG/DL (ref 0.5–1)
GFR, ESTIMATED: >90 ML/MIN/1.73M2
GLUCOSE BLD-MCNC: 87 MG/DL (ref 74–99)
HCT VFR BLD CALC: 41.4 % (ref 34–48)
HDLC SERPL-MCNC: 119 MG/DL
HEMOGLOBIN: 13.2 G/DL (ref 11.5–15.5)
LDL CHOLESTEROL: 61 MG/DL
MCH RBC QN AUTO: 30.3 PG (ref 26–35)
MCHC RBC AUTO-ENTMCNC: 31.9 G/DL (ref 32–34.5)
MCV RBC AUTO: 95.2 FL (ref 80–99.9)
PDW BLD-RTO: 13.4 % (ref 11.5–15)
PLATELET # BLD: 259 K/UL (ref 130–450)
PMV BLD AUTO: 10.5 FL (ref 7–12)
POTASSIUM SERPL-SCNC: 4.1 MMOL/L (ref 3.5–5)
RBC # BLD: 4.35 M/UL (ref 3.5–5.5)
SODIUM BLD-SCNC: 143 MMOL/L (ref 132–146)
TOTAL PROTEIN: 7.2 G/DL (ref 6.4–8.3)
TRIGLYCERIDE, FASTING: 25 MG/DL
TSH SERPL DL<=0.05 MIU/L-ACNC: 1.67 UIU/ML (ref 0.27–4.2)
VITAMIN D 25-HYDROXY: 68.4 NG/ML (ref 30–100)
VLDLC SERPL CALC-MCNC: 5 MG/DL
WBC # BLD: 3.4 K/UL (ref 4.5–11.5)

## 2024-07-11 PROCEDURE — 1036F TOBACCO NON-USER: CPT | Performed by: FAMILY MEDICINE

## 2024-07-11 PROCEDURE — 99213 OFFICE O/P EST LOW 20 MIN: CPT | Performed by: FAMILY MEDICINE

## 2024-07-11 PROCEDURE — 3017F COLORECTAL CA SCREEN DOC REV: CPT | Performed by: FAMILY MEDICINE

## 2024-07-11 PROCEDURE — G8420 CALC BMI NORM PARAMETERS: HCPCS | Performed by: FAMILY MEDICINE

## 2024-07-11 PROCEDURE — 36415 COLL VENOUS BLD VENIPUNCTURE: CPT | Performed by: FAMILY MEDICINE

## 2024-07-11 PROCEDURE — G8427 DOCREV CUR MEDS BY ELIG CLIN: HCPCS | Performed by: FAMILY MEDICINE

## 2024-07-11 RX ORDER — TRIAMCINOLONE ACETONIDE 1 MG/G
CREAM TOPICAL
Qty: 45 G | Refills: 0 | Status: SHIPPED | OUTPATIENT
Start: 2024-07-11

## 2024-07-11 SDOH — ECONOMIC STABILITY: FOOD INSECURITY: WITHIN THE PAST 12 MONTHS, YOU WORRIED THAT YOUR FOOD WOULD RUN OUT BEFORE YOU GOT MONEY TO BUY MORE.: NEVER TRUE

## 2024-07-11 SDOH — ECONOMIC STABILITY: FOOD INSECURITY: WITHIN THE PAST 12 MONTHS, THE FOOD YOU BOUGHT JUST DIDN'T LAST AND YOU DIDN'T HAVE MONEY TO GET MORE.: NEVER TRUE

## 2024-07-11 SDOH — ECONOMIC STABILITY: INCOME INSECURITY: HOW HARD IS IT FOR YOU TO PAY FOR THE VERY BASICS LIKE FOOD, HOUSING, MEDICAL CARE, AND HEATING?: NOT HARD AT ALL

## 2024-07-11 ASSESSMENT — PATIENT HEALTH QUESTIONNAIRE - PHQ9
SUM OF ALL RESPONSES TO PHQ9 QUESTIONS 1 & 2: 0
1. LITTLE INTEREST OR PLEASURE IN DOING THINGS: NOT AT ALL
SUM OF ALL RESPONSES TO PHQ QUESTIONS 1-9: 0
2. FEELING DOWN, DEPRESSED OR HOPELESS: NOT AT ALL

## 2024-07-11 NOTE — PROGRESS NOTES
[facility]  Department of Family Medicine  Family Medicine Residency Program      Patient:  Goldie Bynum 58 y.o. female  Date of Service: 7/11/24      Chief complaint:   Chief Complaint   Patient presents with    Osteopenia     Chronic Pain     Fibromyalgia     Skin Problem     Inside of left ankle has been itchy for 3 months        Assessment and Plan   1. Itch of skin  Treat with topical steroid and follow clinically  - triamcinolone (KENALOG) 0.1 % cream; Apply topically 2 times daily.  Dispense: 45 g; Refill: 0    2. Osteopenia, unspecified location  Check labs  - CBC  - Comprehensive Metabolic Panel  - TSH    3. Fibromyalgia  Continue Neurontin  - CBC  - Comprehensive Metabolic Panel  - TSH    4. Vitamin D deficiency  - Vitamin D 25 Hydroxy    5. Screening, lipid  - Lipid, Fasting      Return to Office: Return in about 6 months (around 1/11/2025) for fibromyalgia.    History ofPresent Illness   The patient is a 58 y.o. female  presented to the clinic with complaints as above.    Osteopenia - not consuming a lot of dairy per day.  Discussed adding about 500-600mg with supplement for bone health.  Apprehensive with constipation side effects.  Discussed miralax does work for sx.     Chronic Pain - effectively tx with gabapentin.  No SE noted.  Tolerating doses.     Skin problem - itching - present about 3m and at this point continues to scratch.  No current irritants identified.  May be in cycle of scratching continuing to irritate skin.     Review of Systems:   Review of Systems   Respiratory:  Negative for chest tightness and shortness of breath.    Cardiovascular:  Negative for chest pain and palpitations.   Gastrointestinal:  Negative for abdominal pain.       Physical Exam   Vitals: /84   Pulse 58   Temp 97.9 °F (36.6 °C)   Resp 17   Ht 1.575 m (5' 2\")   Wt 52.6 kg (116 lb)   SpO2 99%   BMI 21.22 kg/m²   BP Readings from Last 3 Encounters:   07/11/24 132/84   05/16/24

## 2024-08-27 ENCOUNTER — TELEPHONE (OUTPATIENT)
Dept: FAMILY MEDICINE CLINIC | Age: 59
End: 2024-08-27

## 2024-08-27 RX ORDER — TRIAMCINOLONE ACETONIDE 5 MG/G
OINTMENT TOPICAL
Qty: 15 G | Refills: 0 | Status: SHIPPED | OUTPATIENT
Start: 2024-08-27 | End: 2024-09-03

## 2024-08-27 NOTE — TELEPHONE ENCOUNTER
Received call from patient stating her rash/itching addressed on 7/11/24 visit - Rx triamcinolone (KENALOG) 0.1 % cream - has not improved. Please advise.

## 2024-08-27 NOTE — TELEPHONE ENCOUNTER
Please try a more potent version of the ointment for up to a week.  If not improving we may need to re-eval or consider derm evaluation.

## 2024-09-10 ENCOUNTER — TELEPHONE (OUTPATIENT)
Dept: FAMILY MEDICINE CLINIC | Age: 59
End: 2024-09-10

## 2024-09-10 DIAGNOSIS — L29.9 ITCH OF SKIN: Primary | ICD-10-CM

## 2024-10-09 DIAGNOSIS — M79.7 FIBROMYALGIA: ICD-10-CM

## 2024-10-09 RX ORDER — GABAPENTIN 600 MG/1
TABLET ORAL
Qty: 270 TABLET | Refills: 0 | Status: SHIPPED | OUTPATIENT
Start: 2024-10-09 | End: 2025-01-07

## 2024-10-09 NOTE — TELEPHONE ENCOUNTER
Name of Medication(s) Requested:  Requested Prescriptions     Pending Prescriptions Disp Refills    gabapentin (NEURONTIN) 600 MG tablet [Pharmacy Med Name: gabapentin 600 mg tablet] 270 tablet 0     Sig: TAKE ONE TABLET BY MOUTH THREE TIMES DAILY       Medication is on current medication list Yes    Dosage and directions were verified? Yes    Quantity verified: 90 day supply     Pharmacy Verified?  Yes    Last Appointment:  7/11/2024    Future appts:  Future Appointments   Date Time Provider Department Center   1/3/2025  9:00 AM Cm Waddell MD [facility]        (If no appt send self scheduling link. .REFILLAPPT)  Scheduling request sent?     [] Yes  [x] No    Does patient need updated?  [] Yes  [x] No

## 2024-10-10 ENCOUNTER — TELEPHONE (OUTPATIENT)
Dept: FAMILY MEDICINE CLINIC | Age: 59
End: 2024-10-10

## 2024-10-10 RX ORDER — TRAZODONE HYDROCHLORIDE 50 MG/1
50 TABLET, FILM COATED ORAL NIGHTLY
Qty: 30 TABLET | Refills: 0 | Status: SHIPPED
Start: 2024-10-10 | End: 2024-10-11

## 2024-10-10 NOTE — TELEPHONE ENCOUNTER
Patient reports getting 2-4 hours total of sleep a night for approximately 2-3 months now. She states she had a previous conversation related to this matter and requested a medication be prescribed for this. She declined the offer of an office visit at this time.

## 2024-10-10 NOTE — TELEPHONE ENCOUNTER
She can try trazodone about 30-40 min prior to bed time and see if it offers benefit.     Can cause AM fatigue for a week or two until you adjust to this med.

## 2024-10-10 NOTE — TELEPHONE ENCOUNTER
Pt calling @ lunch requesting to speak to PCP nurse regarding her sleeping issues. Please return call

## 2024-10-11 RX ORDER — NORTRIPTYLINE HCL 10 MG
CAPSULE ORAL
Qty: 90 CAPSULE | Refills: 1 | Status: SHIPPED | OUTPATIENT
Start: 2024-10-11

## 2024-10-11 NOTE — TELEPHONE ENCOUNTER
The other agents I have tried  Trazodone up to 150mg  Nortriptyline (pamelor)  Amitriptyline (elavil)  Remeron (mirtazipine)  Possibly prazosin.    If she hasn't tried nortriptyline I would use that as the next try.

## 2024-10-11 NOTE — TELEPHONE ENCOUNTER
Patient states she has tried trazodone in the past with limited results. She states she would get about 4 hours of sleep then be wide awake afterward. She did not think this medication would be effective and wished to know if there was anything else which could work.

## 2024-10-15 ENCOUNTER — TELEPHONE (OUTPATIENT)
Dept: FAMILY MEDICINE CLINIC | Age: 59
End: 2024-10-15

## 2024-10-15 RX ORDER — MIRTAZAPINE 15 MG/1
TABLET, FILM COATED ORAL
Qty: 30 TABLET | Refills: 0 | Status: SHIPPED | OUTPATIENT
Start: 2024-10-15

## 2024-10-15 RX ORDER — TRAZODONE HYDROCHLORIDE 150 MG/1
TABLET ORAL
Qty: 30 TABLET | Refills: 0 | Status: SHIPPED | OUTPATIENT
Start: 2024-10-15

## 2024-10-15 NOTE — TELEPHONE ENCOUNTER
Nortriptyline given for sleep but had side effects consisting of dizziness, headaches, sweating.  Something else that you can Rx?  Trazodone taken in past which only works for 4 hours then stops.  If anything else, she would like to try or if nothing else then Trazodone to help her somewhat.  Leaving for [place] Thursday and would like something prior.  Thanks.

## 2024-10-15 NOTE — TELEPHONE ENCOUNTER
I spoke to her.  I will send trazodone and remeron - she can use EITHER one but not both.      We will meet when she is back if still struggling.

## 2024-12-03 ENCOUNTER — TELEPHONE (OUTPATIENT)
Dept: FAMILY MEDICINE CLINIC | Age: 59
End: 2024-12-03

## 2024-12-03 RX ORDER — TRAZODONE HYDROCHLORIDE 150 MG/1
TABLET ORAL
Qty: 90 TABLET | Refills: 3 | Status: SHIPPED | OUTPATIENT
Start: 2024-12-03

## 2024-12-03 NOTE — TELEPHONE ENCOUNTER
Last Appointment   7/11/2024  Next Appointment  1/3/2025    Patient requesting refill of Trazodone. Would like this sent to [facility].

## 2025-01-03 ENCOUNTER — OFFICE VISIT (OUTPATIENT)
Dept: FAMILY MEDICINE CLINIC | Age: 60
End: 2025-01-03
Payer: COMMERCIAL

## 2025-01-03 VITALS
HEIGHT: 60 IN | HEART RATE: 53 BPM | DIASTOLIC BLOOD PRESSURE: 71 MMHG | RESPIRATION RATE: 16 BRPM | SYSTOLIC BLOOD PRESSURE: 139 MMHG | OXYGEN SATURATION: 99 % | TEMPERATURE: 97.6 F | WEIGHT: 111 LBS | BODY MASS INDEX: 21.79 KG/M2

## 2025-01-03 DIAGNOSIS — M79.7 FIBROMYALGIA: Primary | ICD-10-CM

## 2025-01-03 DIAGNOSIS — M25.511 BILATERAL SHOULDER PAIN, UNSPECIFIED CHRONICITY: ICD-10-CM

## 2025-01-03 DIAGNOSIS — R19.7 DIARRHEA, UNSPECIFIED TYPE: ICD-10-CM

## 2025-01-03 DIAGNOSIS — M25.512 BILATERAL SHOULDER PAIN, UNSPECIFIED CHRONICITY: ICD-10-CM

## 2025-01-03 PROCEDURE — G8420 CALC BMI NORM PARAMETERS: HCPCS | Performed by: FAMILY MEDICINE

## 2025-01-03 PROCEDURE — 99214 OFFICE O/P EST MOD 30 MIN: CPT | Performed by: FAMILY MEDICINE

## 2025-01-03 PROCEDURE — 3017F COLORECTAL CA SCREEN DOC REV: CPT | Performed by: FAMILY MEDICINE

## 2025-01-03 PROCEDURE — G8427 DOCREV CUR MEDS BY ELIG CLIN: HCPCS | Performed by: FAMILY MEDICINE

## 2025-01-03 PROCEDURE — 1036F TOBACCO NON-USER: CPT | Performed by: FAMILY MEDICINE

## 2025-01-03 RX ORDER — GABAPENTIN 600 MG/1
TABLET ORAL
Qty: 270 TABLET | Refills: 0 | Status: SHIPPED | OUTPATIENT
Start: 2025-01-03 | End: 2025-04-03

## 2025-01-03 ASSESSMENT — PATIENT HEALTH QUESTIONNAIRE - PHQ9
SUM OF ALL RESPONSES TO PHQ QUESTIONS 1-9: 0
SUM OF ALL RESPONSES TO PHQ QUESTIONS 1-9: 0
2. FEELING DOWN, DEPRESSED OR HOPELESS: NOT AT ALL
SUM OF ALL RESPONSES TO PHQ9 QUESTIONS 1 & 2: 0
SUM OF ALL RESPONSES TO PHQ QUESTIONS 1-9: 0
1. LITTLE INTEREST OR PLEASURE IN DOING THINGS: NOT AT ALL
SUM OF ALL RESPONSES TO PHQ QUESTIONS 1-9: 0

## 2025-01-03 NOTE — PROGRESS NOTES
[facility]  Department of Family Medicine  Family Medicine Residency Program      Patient:  Goldie Bynum 59 y.o. female  Date of Service: 1/3/25      Chief complaint:   Chief Complaint   Patient presents with    Chronic Pain     Fibromyalgia     Skin Problem     Still having itchy skin - saw dermatology was prescribed cream. Still not helping.     Diarrhea     For a week; since last Friday - was out of town,     Other     Quit vaping 11 days ago, now has her appetite back.        Assessment and Plan   1. Fibromyalgia  Refill gabapentin.  - gabapentin (NEURONTIN) 600 MG tablet; TAKE ONE TABLET BY MOUTH THREE TIMES DAILY  Dispense: 270 tablet; Refill: 0    2. Bilateral shoulder pain, unspecified chronicity  Will check x-rays consider sports med versus physical therapy versus injection in the future  - XR SHOULDER RIGHT (MIN 2 VIEWS); Future  - XR SHOULDER LEFT (MIN 2 VIEWS); Future    3. Diarrhea, unspecified type  If diarrhea continues we will get culture.  She can use Imodium we discussed OTC dosing      Return to Office: Return in about 6 months (around 7/3/2025).    History ofPresent Illness   The patient is a 59 y.o. female  presented to the clinic with complaints as above.    diarrhea - has been eating more after stopping vaping. Present about a week.  Occurred after eating at a restaurant (finance also had similar experience but has since resolved).  Currently just liquid stools.  No abd pains unless gets some cramping and then resolves with diarrhea episode.  No blood.     Fibromyalgia - has been exercising and overall is feeling better.  Has been exercising with fiance (wedding planned march 2025).  Sx have been improved with exercise.     Shoulder pain - has issues with vacuuming and with external rotation type exercises.  Pain is deep in the shoulder and she points around the anterior cuff region.  Has started a good number of biceps/triceps exercises recently.      Review of

## 2025-01-08 ENCOUNTER — HOSPITAL ENCOUNTER (OUTPATIENT)
Dept: GENERAL RADIOLOGY | Age: 60
Discharge: HOME | End: 2025-01-10
Payer: COMMERCIAL

## 2025-01-08 ENCOUNTER — HOSPITAL ENCOUNTER (OUTPATIENT)
Age: 60
Discharge: HOME | End: 2025-01-10
Payer: COMMERCIAL

## 2025-01-08 DIAGNOSIS — M25.511 BILATERAL SHOULDER PAIN, UNSPECIFIED CHRONICITY: ICD-10-CM

## 2025-01-08 DIAGNOSIS — M25.512 BILATERAL SHOULDER PAIN, UNSPECIFIED CHRONICITY: ICD-10-CM

## 2025-01-08 PROCEDURE — 73030 X-RAY EXAM OF SHOULDER: CPT

## (undated) DEVICE — GRADUATE TRIANG MEASURE 1000ML BLK PRNT

## (undated) DEVICE — SPONGE GZ W4XL4IN RAYON POLY CVR W/NONWOVEN FAB STRL 2/PK